# Patient Record
Sex: FEMALE | Race: WHITE | Employment: FULL TIME | URBAN - NONMETROPOLITAN AREA
[De-identification: names, ages, dates, MRNs, and addresses within clinical notes are randomized per-mention and may not be internally consistent; named-entity substitution may affect disease eponyms.]

---

## 2022-03-29 ENCOUNTER — OFFICE VISIT (OUTPATIENT)
Dept: FAMILY MEDICINE CLINIC | Age: 37
End: 2022-03-29
Payer: COMMERCIAL

## 2022-03-29 VITALS
SYSTOLIC BLOOD PRESSURE: 116 MMHG | HEART RATE: 74 BPM | DIASTOLIC BLOOD PRESSURE: 70 MMHG | HEIGHT: 69 IN | WEIGHT: 157 LBS | BODY MASS INDEX: 23.25 KG/M2 | RESPIRATION RATE: 16 BRPM

## 2022-03-29 DIAGNOSIS — R22.2 MASS OF RIGHT CHEST WALL: Primary | ICD-10-CM

## 2022-03-29 DIAGNOSIS — Z83.42 FAMILY HISTORY OF HYPERCHOLESTEROLEMIA: ICD-10-CM

## 2022-03-29 DIAGNOSIS — Z80.3 FAMILY HISTORY OF BREAST CANCER: ICD-10-CM

## 2022-03-29 PROBLEM — N60.19 FIBROCYSTIC BREAST DISEASE (FCBD) IN FEMALE: Status: ACTIVE | Noted: 2020-03-17

## 2022-03-29 PROBLEM — F32.A ANXIETY AND DEPRESSION: Status: ACTIVE | Noted: 2017-11-10

## 2022-03-29 PROBLEM — F41.9 ANXIETY AND DEPRESSION: Status: ACTIVE | Noted: 2017-11-10

## 2022-03-29 PROBLEM — R22.31 AXILLARY MASS, RIGHT: Status: ACTIVE | Noted: 2020-10-21

## 2022-03-29 PROCEDURE — 99204 OFFICE O/P NEW MOD 45 MIN: CPT | Performed by: NURSE PRACTITIONER

## 2022-03-29 RX ORDER — BUPROPION HYDROCHLORIDE 150 MG/1
150 TABLET ORAL EVERY MORNING
COMMUNITY
End: 2022-10-19 | Stop reason: SDUPTHER

## 2022-03-29 SDOH — ECONOMIC STABILITY: FOOD INSECURITY: WITHIN THE PAST 12 MONTHS, YOU WORRIED THAT YOUR FOOD WOULD RUN OUT BEFORE YOU GOT MONEY TO BUY MORE.: NEVER TRUE

## 2022-03-29 SDOH — ECONOMIC STABILITY: FOOD INSECURITY: WITHIN THE PAST 12 MONTHS, THE FOOD YOU BOUGHT JUST DIDN'T LAST AND YOU DIDN'T HAVE MONEY TO GET MORE.: NEVER TRUE

## 2022-03-29 ASSESSMENT — ENCOUNTER SYMPTOMS
EYE REDNESS: 0
DIARRHEA: 0
SINUS PRESSURE: 0
ABDOMINAL PAIN: 0
COUGH: 0
WHEEZING: 0
BACK PAIN: 0
BLOOD IN STOOL: 0
SHORTNESS OF BREATH: 0
COLOR CHANGE: 0
EYE DISCHARGE: 0
CONSTIPATION: 0
EYE ITCHING: 0
EYE PAIN: 0

## 2022-03-29 ASSESSMENT — PATIENT HEALTH QUESTIONNAIRE - PHQ9
SUM OF ALL RESPONSES TO PHQ QUESTIONS 1-9: 0
SUM OF ALL RESPONSES TO PHQ QUESTIONS 1-9: 0
1. LITTLE INTEREST OR PLEASURE IN DOING THINGS: 0
SUM OF ALL RESPONSES TO PHQ QUESTIONS 1-9: 0
SUM OF ALL RESPONSES TO PHQ QUESTIONS 1-9: 0
SUM OF ALL RESPONSES TO PHQ9 QUESTIONS 1 & 2: 0
2. FEELING DOWN, DEPRESSED OR HOPELESS: 0

## 2022-03-29 ASSESSMENT — SOCIAL DETERMINANTS OF HEALTH (SDOH): HOW HARD IS IT FOR YOU TO PAY FOR THE VERY BASICS LIKE FOOD, HOUSING, MEDICAL CARE, AND HEATING?: NOT HARD AT ALL

## 2022-03-29 NOTE — PATIENT INSTRUCTIONS
Patient Education        Well Visit, Ages 25 to 48: Care Instructions  Overview     Well visits can help you stay healthy. Your doctor has checked your overall health and may have suggested ways to take good care of yourself. Your doctor also may have recommended tests. At home, you can help prevent illness withhealthy eating, regular exercise, and other steps. Follow-up care is a key part of your treatment and safety. Be sure to make and go to all appointments, and call your doctor if you are having problems. It's also a good idea to know your test results and keep alist of the medicines you take. How can you care for yourself at home?  Get screening tests that you and your doctor decide on. Screening helps find diseases before any symptoms appear.  Eat healthy foods. Choose fruits, vegetables, whole grains, protein, and low-fat dairy foods. Limit fat, especially saturated fat. Reduce salt in your diet.  Limit alcohol. If you are a man, have no more than 2 drinks a day or 14 drinks a week. If you are a woman, have no more than 1 drink a day or 7 drinks a week.  Get at least 30 minutes of physical activity on most days of the week. Walking is a good choice. You also may want to do other activities, such as running, swimming, cycling, or playing tennis or team sports. Discuss any changes in your exercise program with your doctor.  Reach and stay at a healthy weight. This will lower your risk for many problems, such as obesity, diabetes, heart disease, and high blood pressure.  Do not smoke or allow others to smoke around you. If you need help quitting, talk to your doctor about stop-smoking programs and medicines. These can increase your chances of quitting for good.  Care for your mental health. It is easy to get weighed down by worry and stress. Learn strategies to manage stress, like deep breathing and mindfulness, and stay connected with your family and community.  If you find you often feel sad or hopeless, talk with your doctor. Treatment can help.  Talk to your doctor about whether you have any risk factors for sexually transmitted infections (STIs). You can help prevent STIs if you wait to have sex with a new partner (or partners) until you've each been tested for STIs. It also helps if you use condoms (male or female condoms) and if you limit your sex partners to one person who only has sex with you. Vaccines are available for some STIs, such as HPV.  Use birth control if it's important to you to prevent pregnancy. Talk with your doctor about the choices available and what might be best for you.  If you think you may have a problem with alcohol or drug use, talk to your doctor. This includes prescription medicines (such as amphetamines and opioids) and illegal drugs (such as cocaine and methamphetamine). Your doctor can help you figure out what type of treatment is best for you.  Protect your skin from too much sun. When you're outdoors from 10 a.m. to 4 p.m., stay in the shade or cover up with clothing and a hat with a wide brim. Wear sunglasses that block UV rays. Even when it's cloudy, put broad-spectrum sunscreen (SPF 30 or higher) on any exposed skin.  See a dentist one or two times a year for checkups and to have your teeth cleaned.  Wear a seat belt in the car. When should you call for help? Watch closely for changes in your health, and be sure to contact your doctor if you have any problems or symptoms that concern you. Where can you learn more? Go to https://IMshoppingkely.healthConscious Box. org and sign in to your [employer] account. Enter P072 in the [facility] box to learn more about \"Well Visit, Ages 25 to 48: Care Instructions. \"     If you do not have an account, please click on the \"Sign Up Now\" link. Current as of: October 6, 2021               Content Version: 13.2  © 6883-5492 [employer]. Care instructions adapted under license by [facility] ([facility]). If you have questions about a medical condition or this instruction, always ask your healthcare professional. [place] [address] any warranty or liability for your use of this information.

## 2022-03-29 NOTE — PROGRESS NOTES
[facility] PROFESSIONAL [facility] MEDICINE  [address] [facility]  Dept: [phone]  Dept Fax: [phone] 272012: [phone]     Visit Date:  3/29/2022    Patient:  Lucia Moe  YOB: 1985  Age: 31 y.o. Gender: female  BMI: Body mass index is 23.02 kg/m². Lucia Moe, New patient, is being seen today for   Chief Complaint   Patient presents with   [facility] Doctor     right side breast lump, x1 mo    . Assessment/Plan      1. Mass of right chest wall  - New  - Diagnostic mammogram and US ordered for further evaluation  - Today's assessment is reassuring  - US BREAST LIMITED RIGHT; Future  - DANILO SURI DIGITAL DIAGNOSTIC BILATERAL; Future    2. Family history of hypercholesterolemia  - Lipid Panel; Future    3. Family history of breast cancer  - DANILO SURI DIGITAL DIAGNOSTIC BILATERAL; Future      Return in about 1 year (around 3/29/2023) for Annual Well Visit. HPI:     New patient. Health maintenance reviewed. Former patient of Dr. Jarett Castillo in [place]. Current specialist includes Dr. Albert Masters, [facility]. . Medical history signifant for anxiety and depression. Taking Wellbutrin. Symptoms well-controlled. Previously treated with lexapro. Current concern related to palpable mass to right upper chest. First noticed 1 month ago. No tenderness or pain. No breast changes. No skin color changes. Soft, moveable. Family history of breast cancer. Was evaluated 1.5 years ago for seperate right axillary mass. This was evaluated twice with repeat US. The mass was determined to be a prominent lymph node. Was recommended for annual breast cancer screenings with mammogram.    PHQ-9 Total Score: 0 (3/29/2022  1:05 PM)      Medications    Current Outpatient Medications:     buPROPion (WELLBUTRIN XL) 150 MG extended release tablet, Take 150 mg by mouth every morning, Disp: , Rfl:     The patient has No Known Allergies.     Past Medical History  Jenae Justin  has a past medical history of Anxiety and Depression. Past Surgical History  The patient  has a past surgical history that includes  section and [place] tooth extraction. Family History  This patient's family history includes Breast Cancer in her maternal grandmother; Heart Disease in her maternal grandfather; High Cholesterol in her maternal grandfather; Prudence Brace in her paternal grandfather. Social History  Jenae Justin  reports that she has never smoked. She has never used smokeless tobacco. She reports previous alcohol use. She reports that she does not use drugs. Health Maintenance:    Health maintenance reviewed. Health Maintenance   Topic Date Due    Hepatitis C screen  Never done    Varicella vaccine (1 of 2 - 2-dose childhood series) Never done    Depression Monitoring  Never done    HIV screen  Never done    Flu vaccine (1) 2021    COVID-19 Vaccine (2 - Booster for Janay series) 10/26/2021    Cervical cancer screen  10/27/2024    DTaP/Tdap/Td vaccine (3 - Td or Tdap) 2030    Hepatitis A vaccine  Aged Out    Hepatitis B vaccine  Aged Out    Hib vaccine  Aged Out    Meningococcal (ACWY) vaccine  Aged Out    Pneumococcal 0-64 years Vaccine  Aged Out       Subjective/Objective:      Review of Systems   Constitutional: Negative for chills, fatigue and fever. HENT: Negative for congestion, ear pain, sinus pressure and sneezing. Eyes: Negative for pain, discharge, redness and itching. Respiratory: Negative for cough, shortness of breath and wheezing. Cardiovascular: Negative for chest pain, palpitations and leg swelling. Gastrointestinal: Negative for abdominal pain, blood in stool, constipation and diarrhea. Endocrine: Negative for polydipsia, polyphagia and polyuria. Genitourinary: Negative for difficulty urinating and hematuria. Musculoskeletal: Negative for arthralgias, back pain and neck pain.    Skin: Negative for color change, pallor and rash. Palpable mass to right upper chest   Allergic/Immunologic: Negative for environmental allergies and food allergies. Neurological: Negative for dizziness, light-headedness, numbness and headaches. Psychiatric/Behavioral: Negative for agitation and confusion. The patient is not nervous/anxious. /70   Pulse 74   Resp 16   Ht 5' 9.25\" (1.759 m)   Wt 157 lb (71.2 kg)   LMP 03/27/2022 (Exact Date)   BMI 23.02 kg/m²     Physical Exam  Vitals and nursing note reviewed. Constitutional:       General: She is awake. Appearance: Normal appearance. HENT:      Head: Normocephalic and atraumatic. Right Ear: Hearing and external ear normal.      Left Ear: Hearing and external ear normal.      Nose: Nose normal. No congestion or rhinorrhea. Eyes:      General: Lids are normal.         Right eye: No discharge. Left eye: No discharge. Conjunctiva/sclera: Conjunctivae normal.   Neck:      Trachea: No tracheal deviation. Cardiovascular:      Rate and Rhythm: Normal rate and regular rhythm. Heart sounds: Normal heart sounds. No murmur heard. Pulmonary:      Effort: Pulmonary effort is normal. No respiratory distress. Breath sounds: No stridor. No wheezing. Chest:      Chest wall: Mass present. Musculoskeletal:      Cervical back: Full passive range of motion without pain. Skin:     General: Skin is dry. Coloration: Skin is not jaundiced or pale. Neurological:      General: No focal deficit present. Mental Status: She is alert. Mental status is at baseline. Psychiatric:         Mood and Affect: Mood and affect normal.         Behavior: Behavior is cooperative. Labs Reviewed 3/29/2022:    No results found for: WBC, HGB, HCT, PLT, CHOL, TRIG, HDL, LDLDIRECT, ALT, AST, NA, K, CL, CREATININE, BUN, CO2, TSH, PSA, INR, GLUF, LABA1C, LABMICR        Patient given educational materials - see patient instructions. Discussed use, benefit, and side effects of prescribed medications. All patient questions answered. Pt voiced understanding. Reviewed health maintenance.        Electronically signed by ELYSSA Whitmore CNP on 3/29/2022 at 1:14 PM EDT

## 2022-03-31 ENCOUNTER — HOSPITAL ENCOUNTER (OUTPATIENT)
Dept: WOMENS IMAGING | Age: 37
Discharge: HOME | End: 2022-03-31
Payer: COMMERCIAL

## 2022-03-31 ENCOUNTER — HOSPITAL ENCOUNTER (OUTPATIENT)
Dept: WOMENS IMAGING | Age: 37
Discharge: HOME | End: 2022-03-31

## 2022-03-31 ENCOUNTER — TELEPHONE (OUTPATIENT)
Dept: FAMILY MEDICINE CLINIC | Age: 37
End: 2022-03-31

## 2022-03-31 DIAGNOSIS — Z00.6 ENCOUNTER FOR EXAMINATION FOR NORMAL COMPARISON OR CONTROL IN CLINICAL RESEARCH PROGRAM: ICD-10-CM

## 2022-03-31 DIAGNOSIS — R22.2 MASS OF RIGHT CHEST WALL: ICD-10-CM

## 2022-03-31 DIAGNOSIS — Z80.3 FAMILY HISTORY OF BREAST CANCER: ICD-10-CM

## 2022-03-31 PROCEDURE — 76642 ULTRASOUND BREAST LIMITED: CPT

## 2022-03-31 PROCEDURE — 77066 DX MAMMO INCL CAD BI: CPT

## 2022-03-31 NOTE — TELEPHONE ENCOUNTER
----- Message from ELYSSA Soto CNP sent at 3/31/2022 12:18 PM EDT -----  No concerning findings noted on US and mammogram. US revealed a potential skin lesion. No treatment necessary at this time. Continue to monitor. Expect a result note in the mail.

## 2022-06-28 ENCOUNTER — OFFICE VISIT (OUTPATIENT)
Dept: FAMILY MEDICINE CLINIC | Age: 37
End: 2022-06-28
Payer: COMMERCIAL

## 2022-06-28 VITALS
BODY MASS INDEX: 22.87 KG/M2 | SYSTOLIC BLOOD PRESSURE: 128 MMHG | WEIGHT: 156 LBS | RESPIRATION RATE: 12 BRPM | DIASTOLIC BLOOD PRESSURE: 76 MMHG | HEART RATE: 78 BPM

## 2022-06-28 DIAGNOSIS — R22.42 MASS OF LEFT FOOT: Primary | ICD-10-CM

## 2022-06-28 PROCEDURE — 99213 OFFICE O/P EST LOW 20 MIN: CPT | Performed by: NURSE PRACTITIONER

## 2022-06-28 ASSESSMENT — ENCOUNTER SYMPTOMS: COLOR CHANGE: 0

## 2022-06-28 NOTE — PATIENT INSTRUCTIONS
Patient Education        Ganglions: Care Instructions  Your Care Instructions     A ganglion is a small sac, or cyst, filled with a clear fluid that is like jelly. A ganglion may look like a bump on the hand or wrist. It also can appear on your feet, ankles, knees, or shoulders. It is not cancer. A ganglion can grow out of the protective area, or capsule, around a joint. It also can grow on a tendon sheath, which covers the ropelike tendons that connect muscle tobone. A ganglion may hurt or cause numbness if it presses on a nerve. Many ganglions do not need treatment, and they often go away on their own. But if a ganglion hurts, becomes larger, causes numbness, or limits your activity, your doctor may want to drain it with a needle and syringe or remove it withminor surgery. Follow-up care is a key part of your treatment and safety. Be sure to make and go to all appointments, and call your doctor if you are having problems. It's also a good idea to know your test results and keep alist of the medicines you take. How can you care for yourself at home?  Wear a wrist or finger splint as directed by your doctor. It will keep your wrist or hand from moving and help reduce the fluid in the cyst. This may be all you need for the ganglion to shrink and go away.  Do not smash a ganglion with a book or other heavy object. You may break a bone or otherwise injure your wrist by trying this folk remedy, and the ganglion may return anyway.  Do not try to drain the fluid by poking the ganglion with a pin or any other sharp object. You could cause an infection. When should you call for help? Call your doctor now or seek immediate medical care if:     You have signs of infection, such as:  ? Increased pain, swelling, warmth, or redness. ? Red streaks leading from the cyst.  ? Pus draining from the cyst.  ? A fever.    Watch closely for changes in your health, and be sure to contact your doctor if:     You have increasing pain.      Your ganglion is getting larger.      You still have pain or numbness from a ganglion. Where can you learn more? Go to https://chpepiceweb.[email or web address] org and sign in to your Wrapp account. Enter C757 in the [facility] box to learn more about \"Ganglions: Care Instructions. \"     If you do not have an account, please click on the \"Sign Up Now\" link. Current as of: March 9, 2022               Content Version: 13.3  © 7501-7032 [employer]. Care instructions adapted under license by [facility] ([facility]). If you have questions about a medical condition or this instruction, always ask your healthcare professional. [place] [address] any warranty or liability for your use of this information. Patient Education        Ganglion Cyst Removal: What to Expect at Home  Your Recovery     Ganglion cyst removal is surgery to remove a ganglion that has caused pain or numbness or made it hard to do your activities. A ganglion is a small sac, or cyst, filled with a clear fluid that is thick like jelly. The cyst may look like a bump on your hand or wrist. Less often, a ganglion can appear on thefeet, ankles, knees, or shoulders. The doctor made a cut (incision) in the skin over the ganglion. He or she removed the ganglion and the connecting tissue that allowed fluid to collect there. Then the incision was closed with stitches. You may have a splint overthe area to limit movement until the area heals. Your doctor will tell you when it's okay to move the area. He or she may giveyou instructions on when you can do your normal activities again. Ganglions sometimes come back. New ganglions also may form in the area. This care sheet gives you a general idea about how long it will take for you to recover. But each person recovers at a different pace. Follow the steps belowto get better as quickly as possible.   How can you care for yourself at home?  Activity     For 1 to 2 weeks after surgery on your hand or wrist, avoid activities that involve repeated arm or hand movements. These may include typing, using a computer mouse, vacuuming, or carrying things in the affected hand. Do not use power tools. And avoid other activities that make your hand vibrate.      If the procedure involved your foot or ankle, ask your doctor if you need to do less walking or driving for a while.      You may be able to go back to work 1 or 2 days after surgery. It depends on the type of work you do and how you feel.      You may shower, but do not get the area wet until your doctor says it's okay. Keep the bandage dry by covering it with plastic. Do not take a bath, swim, use a hot tub, or soak your hand until your doctor says it's okay. Diet     You can eat your normal diet when you feel well. If your stomach is upset, try bland, low-fat foods like plain rice, broiled chicken, toast, and yogurt. Medicines     Your doctor will tell you if and when you can restart your medicines. He or she will also give you instructions about taking any new medicines.      If you take aspirin or some other blood thinner, be sure to talk to your doctor. He or she will tell you if and when to start taking this medicine again. Make sure that you understand exactly what your doctor wants you to do.      Be safe with medicines. Read and follow all instructions on the label. ? If the doctor gave you a prescription medicine for pain, take it as prescribed. ? If you are not taking a prescription pain medicine, ask your doctor if you can take an over-the-counter medicine. Incision care     Leave the bandage on your hand until the doctor says it is okay to remove it. This is usually 2 or 3 days after surgery.      After your doctor says you can take off your bandage, wash the area daily with clean water, and pat it dry. Don't use hydrogen peroxide or alcohol. They can slow healing. You may cover the area with a gauze bandage if it oozes fluid or rubs against clothing. Change the bandage every day.      Keep the area clean and dry.      If you have a splint, do not take it off unless your doctor tells you to. Follow the splint care instructions your doctor gives you. Be careful not to put the splint on too tight. Exercise     Follow your doctor's directions on when and how to move the area to keep it flexible and help reduce swelling. Your doctor may have you wear a splint or brace for a short time after the surgery.      If the ganglion is on your wrist or hand, you may need therapy after you heal. This can help you regain movement, strength, and  in your wrist and hand. To get the best results, you need to do the exercises correctly and as often and as long as your doctor or your physical or occupational therapist tells you to. Ice and elevation     If you have swelling, put ice or a cold pack on the area for 10 to 20 minutes at a time. Try to do this every 1 to 2 hours for the next 3 days (when you are awake) or until the swelling goes down. Put a thin cloth between the ice and your skin or splint.      Prop up the area on a pillow anytime you sit or lie down for the first 2 or 3 days. Try to keep it above the level of your heart. This will help reduce swelling. Follow-up care is a key part of your treatment and safety. Be sure to make and go to all appointments, and call your doctor if you are having problems. It's also a good idea to know your test results and keep alist of the medicines you take. When should you call for help? Call 911  anytime you think you may need emergency care. For example, call if:     You passed out (lost consciousness).      You have chest pain, are short of breath, or cough up blood.    Call your doctor now or seek immediate medical care if:     You have pain that does not get better after you take pain medicine.      You have loose stitches, or your incision comes open.      Bright red blood has soaked through the bandage over your incision.      You have symptoms of infection, such as:  ? Increased pain, swelling, warmth, or redness. ? Red streaks leading from the area. ? Pus draining from the area. ? A fever.      The area is cool or pale or changes color.      The area is tingly, weak, or numb.      You can't move the area.      Your splint feels too tight. Watch closely for any changes in your health, and be sure to contact yourdoctor if:     You do not get better as expected. Where can you learn more? Go to https://chpepiceweb.[email or web address] B [email or web address] org and sign in to your Sungy Mobile account. Enter G125 in the [employer] box to learn more about \"Ganglion Cyst Removal: What to Expect at Home. \"     If you do not have an account, please click on the \"Sign Up Now\" link. Current as of: March 9, 2022               Content Version: 13.3  © 2006-2022 [employer]. Care instructions adapted under license by [facility] ([facility]). If you have questions about a medical condition or this instruction, always ask your healthcare professional. [place] [address] any warranty or liability for your use of this information. Patient Education        Lipoma: Care Instructions  Your Care Instructions  A lipoma is a growth of fat just below the skin. It may feel soft and rubbery. Lipomas can occur anywhere on the body. But they are most common on the torso,neck, upper thighs, upper arms, and armpits. A lipoma does not turn into cancer. Lipomas usually are not treated, because most of them don't hurt or cause problems. But your doctor may remove a lipoma if it is painful, gets infected,or bothers you. Follow-up care is a key part of your treatment and safety. Be sure to make and go to all appointments, and call your doctor if you are having problems.  It's also a good idea to know your test results and keep tumors don't usually spread to other tissues and organs. They usually aren't life-threatening. But they can cause problems if they grow toomuch or damage healthy bone. Most bone tumors are benign. Many benign bone tumors may not need to be treated. But if the tumor causes pain, weakens the bone, or keeps you frommoving a part of your body, it may need to be removed. What are some common types of benign bone tumors? Benign bone tumors grow inside the bones. As they grow, these tumors may involve tissues near the bones, such as tendons and ligaments that attach tothe bone. Osteochondroma. These tumors appear most often in the bones of the legs. They may feel like abony spur on the knee or upper shoulder. Giant cell tumor of bone. These are fast-growing tumors that need to be treated as soon as possible. Theyare more common in young adults. They often appear near the knee. Osteoblastoma. These are often found in the spine or pelvis. Aneurysmal bone cyst.   These may be found in the arms, legs, or spine. They are blood-filled growthsthat can swell inside the bone. Fibrous dysplasia. This can affect one or more bones. It may be found in the long bones of thearms and legs. Enchondroma. This is another type of bone tumor that can start in the cartilage. It often appears on the hands and feet. It can also appear on the long bones of the armsand legs. What are the symptoms? Sometimes the bone tumor can be felt as a bump on one of the bones. Or it mightbe inside the bone, and you won't feel it. You may also feel pain near the tumor. Tumors can weaken bone, which can then break, or fracture. A tumor that grows near a joint, like your shoulder or knee, may keep you from being able to moveyour arm or leg freely. How are they diagnosed? Your doctor will ask you about your symptoms and past health and will examine you.  If your doctor can feel a bone tumor, or if you have other symptoms, you will get some tests. The tests can help make sure the tumor isn't cancer. Rosalee Roe also help the doctor find the best treatment for the tumor. Your doctor may also find a tumor when taking X-rays or images for anotherproblem.  You may have one or more imaging tests to get a better look at the tumor. These may include:  ? X-rays. ? A CT scan. ? An MRI scan. ? A bone scan.  You may need a blood test and other lab tests.  You may need a biopsy so a sample of the tumor can be looked at under a microscope. Kory Moralez may also look at other parts of your body for other tumors. How are they treated? Some benign bone tumors that aren't causing problems can be managed with regular checkups and imaging tests. But if the tumor is causing problems, thentreatment will likely be needed. A doctor may remove a tumor with surgery. If part of the bone is removed, itmay be replaced with new bone or artificial bone. Ask the doctor or specialist about other types of treatments available for thetumor. After treatment, the doctor may want to check the area again to make sure thegrowth doesn't come back. Follow-up care is a key part of your treatment and safety. Be sure to make and go to all appointments, and call your doctor if you are having problems. It's also a good idea to know your test results and keep alist of the medicines you take. Where can you learn more? Go to https://CanoPkjGlobalCrypto.Trustribe. org and sign in to your Groupsite account. Enter [phone] in the [facility] box to learn more about \"Learning About Benign Bone Tumors. \"     If you do not have an account, please click on the \"Sign Up Now\" link. Current as of: September 8, 2021               Content Version: 13.3  © 2006-2022 [employer]. Care instructions adapted under license by [facility] ([facility]).  If you have questions about a medical condition or this instruction, always ask your healthcare professional. Brayan Boothe any warranty or liability for your use of this information.

## 2022-06-28 NOTE — PROGRESS NOTES
Sunday Church (:  1985) is a 30 y.o. female,Established patient, here for evaluation of the following chief complaint(s): Mass (left foot, not painful, unsure how long its been there )         ASSESSMENT/PLAN:  1. Mass of left foot  - New  - Symptoms most consistent with pedal ganglion cyst  - No treatment necessary at this time given asymptomatic status  - Consider podiatry referral if area becomes tender or continues to enlarge    Return if symptoms worsen or fail to improve. Subjective   SUBJECTIVE/OBJECTIVE:  Left foot mass. First noticed 1 week ago. Nonpainful. No skin changes. No previous injury. Movable. Has not grown in size since first noticing. Has never experienced similar symptoms. Review of Systems   Constitutional: Negative for fever. Musculoskeletal: Negative for gait problem, joint swelling and myalgias. Skin: Negative for color change and wound. Lesion of left foot          Objective   Physical Exam  Constitutional:       General: She is not in acute distress. Appearance: Normal appearance. HENT:      Head: Normocephalic and atraumatic. Right Ear: External ear normal.      Left Ear: External ear normal.      Nose: No nasal deformity or rhinorrhea. Mouth/Throat:      Lips: Pink. No lesions. Eyes:      General: Lids are normal.         Right eye: No discharge. Left eye: No discharge. Conjunctiva/sclera: Conjunctivae normal.   Pulmonary:      Effort: No accessory muscle usage or respiratory distress. Breath sounds: No stridor. Musculoskeletal:      Cervical back: Normal range of motion. Feet:    Skin:     Coloration: Skin is not pale. Findings: No rash. Neurological:      General: No focal deficit present. Mental Status: She is alert. Mental status is at baseline. Psychiatric:         Mood and Affect: Mood normal.         Behavior: Behavior is cooperative.                   An electronic signature was used to authenticate this note.     --ELYSSA Cedillo - CNP

## 2022-08-16 ENCOUNTER — HOSPITAL ENCOUNTER (OUTPATIENT)
Age: 37
Discharge: HOME | End: 2022-08-16
Payer: COMMERCIAL

## 2022-08-16 ENCOUNTER — OFFICE VISIT (OUTPATIENT)
Dept: FAMILY MEDICINE CLINIC | Age: 37
End: 2022-08-16
Payer: COMMERCIAL

## 2022-08-16 VITALS
DIASTOLIC BLOOD PRESSURE: 72 MMHG | SYSTOLIC BLOOD PRESSURE: 110 MMHG | HEART RATE: 85 BPM | BODY MASS INDEX: 23.02 KG/M2 | WEIGHT: 157 LBS | OXYGEN SATURATION: 99 %

## 2022-08-16 DIAGNOSIS — M94.0 ACUTE COSTOCHONDRITIS: Primary | ICD-10-CM

## 2022-08-16 DIAGNOSIS — Z83.42 FAMILY HISTORY OF HYPERCHOLESTEROLEMIA: ICD-10-CM

## 2022-08-16 LAB
CHOLESTEROL, TOTAL: 187 MG/DL (ref 100–199)
HDLC SERPL-MCNC: 53 MG/DL
LDL CHOLESTEROL CALCULATED: 117 MG/DL
TRIGL SERPL-MCNC: 85 MG/DL (ref 0–199)

## 2022-08-16 PROCEDURE — 80061 LIPID PANEL: CPT

## 2022-08-16 PROCEDURE — 36415 COLL VENOUS BLD VENIPUNCTURE: CPT

## 2022-08-16 PROCEDURE — 99213 OFFICE O/P EST LOW 20 MIN: CPT | Performed by: NURSE PRACTITIONER

## 2022-08-16 RX ORDER — PREDNISONE 20 MG/1
TABLET ORAL
Qty: 15 TABLET | Refills: 0 | Status: SHIPPED | OUTPATIENT
Start: 2022-08-16 | End: 2022-10-18 | Stop reason: ALTCHOICE

## 2022-08-16 ASSESSMENT — ENCOUNTER SYMPTOMS: SHORTNESS OF BREATH: 0

## 2022-08-16 NOTE — PROGRESS NOTES
Karena Tanner (:  1985) is a 30 y.o. female,Established patient, here for evaluation of the following chief complaint(s):  Chest Pain (No SOB or or weakness, no pressure, x2 months )         ASSESSMENT/PLAN:  1. Acute costochondritis  - Acute  - Start prednisone for symptoms consistent with costochondritis  - Based on current presentation, symptoms to not appear to be cardiac in nature  - Notify office if no improvement in symptoms  -     predniSONE (DELTASONE) 20 MG tablet; Take 1 tablet twice daily for five days, then take 1 tablet once a day for 5 days. , Disp-15 tablet, R-0Normal    Return if symptoms worsen or fail to improve. Subjective   SUBJECTIVE/OBJECTIVE:  Chest Pain   This is a new problem. The current episode started more than 1 month ago (2 months). The onset quality is sudden. The problem occurs daily. The problem has been unchanged. The pain is present in the lateral region. The quality of the pain is described as pressure (aching). The pain does not radiate. Pertinent negatives include no diaphoresis, exertional chest pressure, irregular heartbeat, numbness or shortness of breath. The pain is aggravated by nothing. She has tried nothing for the symptoms. Pertinent negatives for past medical history include no CAD. Her family medical history is significant for heart disease. Review of Systems   Constitutional:  Negative for diaphoresis. Respiratory:  Negative for shortness of breath. Cardiovascular:  Positive for chest pain. Neurological:  Negative for syncope and numbness. Objective   Physical Exam  Vitals and nursing note reviewed. Constitutional:       General: She is awake. Appearance: Normal appearance. HENT:      Head: Normocephalic and atraumatic. Right Ear: Hearing and external ear normal.      Left Ear: Hearing and external ear normal.      Nose: Nose normal. No congestion or rhinorrhea.    Eyes:      General: Lids are normal. Right eye: No discharge. Left eye: No discharge. Conjunctiva/sclera: Conjunctivae normal.   Neck:      Trachea: No tracheal deviation. Cardiovascular:      Rate and Rhythm: Normal rate and regular rhythm. Heart sounds: Normal heart sounds. No murmur heard. Pulmonary:      Effort: Pulmonary effort is normal. No respiratory distress. Breath sounds: No stridor. No wheezing. Chest:      Chest wall: Tenderness present. No mass or swelling. Musculoskeletal:      Cervical back: Full passive range of motion without pain. Skin:     General: Skin is dry. Coloration: Skin is not jaundiced or pale. Neurological:      General: No focal deficit present. Mental Status: She is alert. Mental status is at baseline. Psychiatric:         Mood and Affect: Mood and affect normal.         Behavior: Behavior is cooperative. An electronic signature was used to authenticate this note.     --ELYSSA Guthrie - CNP

## 2022-08-17 ENCOUNTER — TELEPHONE (OUTPATIENT)
Dept: FAMILY MEDICINE CLINIC | Age: 37
End: 2022-08-17

## 2022-08-17 NOTE — TELEPHONE ENCOUNTER
----- Message from ELYSSA Villafana CNP sent at 8/17/2022  9:41 AM EDT -----  Cholesterol looks okay. LDL slightly elevated but total and hdl are good. No changes necessary. Continue with healthy diet and exercise.

## 2022-10-18 ENCOUNTER — HOSPITAL ENCOUNTER (EMERGENCY)
Age: 37
Discharge: HOME | End: 2022-10-18
Payer: COMMERCIAL

## 2022-10-18 VITALS
DIASTOLIC BLOOD PRESSURE: 67 MMHG | OXYGEN SATURATION: 98 % | TEMPERATURE: 97.5 F | RESPIRATION RATE: 16 BRPM | HEART RATE: 85 BPM | BODY MASS INDEX: 24.61 KG/M2 | WEIGHT: 156.8 LBS | HEIGHT: 67 IN | SYSTOLIC BLOOD PRESSURE: 126 MMHG

## 2022-10-18 DIAGNOSIS — J20.8 ACUTE VIRAL BRONCHITIS: Primary | ICD-10-CM

## 2022-10-18 PROCEDURE — 99213 OFFICE O/P EST LOW 20 MIN: CPT | Performed by: NURSE PRACTITIONER

## 2022-10-18 PROCEDURE — 99213 OFFICE O/P EST LOW 20 MIN: CPT

## 2022-10-18 RX ORDER — PREDNISONE 20 MG/1
20 TABLET ORAL DAILY
Qty: 5 TABLET | Refills: 0 | Status: SHIPPED | OUTPATIENT
Start: 2022-10-18 | End: 2022-10-23

## 2022-10-18 RX ORDER — ALBUTEROL SULFATE 90 UG/1
2 AEROSOL, METERED RESPIRATORY (INHALATION) EVERY 6 HOURS PRN
Qty: 18 G | Refills: 0 | Status: SHIPPED | OUTPATIENT
Start: 2022-10-18

## 2022-10-18 ASSESSMENT — ENCOUNTER SYMPTOMS
RHINORRHEA: 0
SORE THROAT: 0
SHORTNESS OF BREATH: 0
DIARRHEA: 0
VOMITING: 0
EYE DISCHARGE: 0
NAUSEA: 0
TROUBLE SWALLOWING: 0
EYE REDNESS: 0
CHEST TIGHTNESS: 1
COUGH: 1

## 2022-10-18 ASSESSMENT — PAIN - FUNCTIONAL ASSESSMENT: PAIN_FUNCTIONAL_ASSESSMENT: 0-10

## 2022-10-18 ASSESSMENT — PAIN SCALES - GENERAL: PAINLEVEL_OUTOF10: 0

## 2022-10-18 NOTE — ED TRIAGE NOTES
Pt c/o persistent cough x 7 days. Pt states cough is mainly dry but occasional productive green sputum noted. Denies fever. States she did take home covid test last Saturday which was negative. Pt states she has \"coughing spells\" that cause her to feel as if she cannot catch her breath at times.

## 2022-10-18 NOTE — ED PROVIDER NOTES
[address]  Urgent Care Encounter      CHIEF COMPLAINT       Chief Complaint   Patient presents with    Cough     X 1 week no improvement       Nurses Notes reviewed and I agree except as noted in the HPI. HISTORY OF PRESENT ILLNESS   Александр Trevizo is a 30 y.o. female who presents for evaluation of cough. Onset 1 week ago, unchanged. Cough is intermittent, productive. Associated chest tightness. No chest pain, shortness of breath. No fever. No travel. No exposure to COVID, strep, flu. Patient began taking Mucinex yesterday without relief. No additional complaints. REVIEW OF SYSTEMS     Review of Systems   Constitutional:  Negative for chills, diaphoresis, fatigue and fever. HENT:  Negative for congestion, ear pain, rhinorrhea, sore throat and trouble swallowing. Eyes:  Negative for discharge and redness. Respiratory:  Positive for cough and chest tightness. Negative for shortness of breath. Cardiovascular:  Negative for chest pain. Gastrointestinal:  Negative for diarrhea, nausea and vomiting. Genitourinary:  Negative for decreased urine volume. Musculoskeletal:  Negative for neck pain and neck stiffness. Skin:  Negative for rash. Neurological:  Negative for headaches. Hematological:  Negative for adenopathy. Psychiatric/Behavioral:  Negative for sleep disturbance. PAST MEDICAL HISTORY         Diagnosis Date    Anxiety     Depression        SURGICAL HISTORY     Patient  has a past surgical history that includes  section and [place] tooth extraction. CURRENT MEDICATIONS       Discharge Medication List as of 10/18/2022  6:17 PM        CONTINUE these medications which have NOT CHANGED    Details   buPROPion (WELLBUTRIN XL) 150 MG extended release tablet Take 150 mg by mouth every morningHistorical Med             ALLERGIES     Patient is has No Known Allergies.     FAMILY HISTORY     Patient'sfamily history includes Breast Cancer (age of onset: 48) in her maternal grandmother; Heart Disease in her maternal grandfather; High Cholesterol in her maternal grandfather; [place] in her paternal grandfather. SOCIAL HISTORY     Patient  reports that she has never smoked. She has never used smokeless tobacco. She reports current alcohol use. She reports that she does not use drugs. PHYSICAL EXAM     ED TRIAGE VITALS  BP: 126/67, Temp: 97.5 °F (36.4 °C), Heart Rate: 85, Resp: 16, SpO2: 98 %  Physical Exam  Vitals and nursing note reviewed. Constitutional:       General: She is not in acute distress. Appearance: Normal appearance. She is well-developed. She is not ill-appearing, toxic-appearing or diaphoretic. HENT:      Head: Normocephalic and atraumatic. Right Ear: Hearing, tympanic membrane, ear canal and external ear normal. No mastoid tenderness. No hemotympanum. Tympanic membrane is not perforated, erythematous or bulging. Left Ear: Hearing, tympanic membrane, ear canal and external ear normal. No mastoid tenderness. No hemotympanum. Tympanic membrane is not perforated, erythematous or bulging. Nose: Nose normal.      Mouth/Throat:      Mouth: Mucous membranes are moist.      Pharynx: Oropharynx is clear. Uvula midline. Tonsils: No tonsillar abscesses. Eyes:      General: No scleral icterus. Conjunctiva/sclera: Conjunctivae normal.      Right eye: Right conjunctiva is not injected. No hemorrhage. Left eye: Left conjunctiva is not injected. No hemorrhage. Neck:      Thyroid: No thyromegaly. Trachea: Trachea normal.   Cardiovascular:      Rate and Rhythm: Normal rate and regular rhythm. No extrasystoles are present. Chest Wall: PMI is not displaced. Heart sounds: Normal heart sounds. No murmur heard. No friction rub. No gallop. Pulmonary:      Effort: Pulmonary effort is normal. No respiratory distress. Breath sounds: Normal breath sounds.    Musculoskeletal:      Cervical back: Normal range of motion and neck supple. Lymphadenopathy:      Head:      Right side of head: No submental, submandibular, tonsillar or occipital adenopathy. Left side of head: No submental, submandibular, tonsillar or occipital adenopathy. Cervical: No cervical adenopathy. Upper Body:      Right upper body: No supraclavicular adenopathy. Left upper body: No supraclavicular adenopathy. Skin:     General: Skin is warm and dry. Capillary Refill: Capillary refill takes less than 2 seconds. Coloration: Skin is not pale. Findings: No rash. Comments: Skin warm and dry to touch, no rashes noted on exposed surfaces. Neurological:      Mental Status: She is alert and oriented to person, place, and time. She is not disoriented. Psychiatric:         Mood and Affect: Mood normal.         Behavior: Behavior is cooperative. DIAGNOSTIC RESULTS   Labs:No results found for this visit on 10/18/22. IMAGING:  No orders to display      URGENT CARE COURSE:     Vitals:    10/18/22 1802   BP: 126/67   Pulse: 85   Resp: 16   Temp: 97.5 °F (36.4 °C)   TempSrc: Temporal   SpO2: 98%   Weight: 156 lb 12.8 oz (71.1 kg)   Height: 5' 7\" (1.702 m)       Medications - No data to display  PROCEDURES:  None  FINAL IMPRESSION      1. Acute viral bronchitis        DISPOSITION/PLAN   DISPOSITION Decision To Discharge 10/18/2022 06:16:20 PM    Nontoxic, no distress. Exam consistent with acute viral bronchitis. Medication as prescribed, continue current treatment. If any distress go to ER. PATIENT REFERRED TO:  Donalee Kehr, APRN - CNP  [address] [facility]  [phone]      Follow-up as needed. Medication as prescribed, continue Mucinex. Increase fluids. If symptoms worsen return or go to ER.     DISCHARGE MEDICATIONS:  Discharge Medication List as of 10/18/2022  6:17 PM        START taking these medications    Details   predniSONE (DELTASONE) 20 MG tablet Take 1 tablet by mouth daily for 5 days, Disp-5 tablet, R-0Normal      albuterol sulfate HFA (PROVENTIL HFA) 108 (90 Base) MCG/ACT inhaler Inhale 2 puffs into the lungs every 6 hours as needed for Wheezing or Shortness of Breath, Disp-18 g, R-0Normal           Discharge Medication List as of 10/18/2022  6:17 PM          ELYSSA Taylor CNP, APRN - CNP  10/18/22 1846

## 2022-10-19 RX ORDER — BUPROPION HYDROCHLORIDE 150 MG/1
150 TABLET ORAL EVERY MORNING
Qty: 90 TABLET | Refills: 1 | Status: SHIPPED | OUTPATIENT
Start: 2022-10-19

## 2022-10-19 NOTE — TELEPHONE ENCOUNTER
[facility] called requesting a refill on the following medications:  Requested Prescriptions     Pending Prescriptions Disp Refills    buPROPion (WELLBUTRIN XL) 150 MG extended release tablet 90 tablet 1     Sig: Take 1 tablet by mouth every morning       Date of last visit: 8/16/2022  Date of next visit (if applicable):Visit date not found  Date of last refill:   Pharmacy Name: [address] [place] [address]       Faisal,  Willian Mitchell MA

## 2022-10-26 ENCOUNTER — TELEPHONE (OUTPATIENT)
Dept: FAMILY MEDICINE CLINIC | Age: 37
End: 2022-10-26

## 2022-10-26 ENCOUNTER — OFFICE VISIT (OUTPATIENT)
Dept: FAMILY MEDICINE CLINIC | Age: 37
End: 2022-10-26
Payer: COMMERCIAL

## 2022-10-26 ENCOUNTER — HOSPITAL ENCOUNTER (OUTPATIENT)
Age: 37
Discharge: HOME | End: 2022-10-26
Payer: COMMERCIAL

## 2022-10-26 ENCOUNTER — HOSPITAL ENCOUNTER (OUTPATIENT)
Dept: GENERAL RADIOLOGY | Age: 37
Discharge: HOME | End: 2022-10-26
Payer: COMMERCIAL

## 2022-10-26 VITALS
HEIGHT: 67 IN | DIASTOLIC BLOOD PRESSURE: 76 MMHG | OXYGEN SATURATION: 98 % | WEIGHT: 156.2 LBS | HEART RATE: 96 BPM | SYSTOLIC BLOOD PRESSURE: 126 MMHG | BODY MASS INDEX: 24.52 KG/M2 | TEMPERATURE: 98.5 F | RESPIRATION RATE: 16 BRPM

## 2022-10-26 DIAGNOSIS — J40 BRONCHITIS: ICD-10-CM

## 2022-10-26 DIAGNOSIS — J40 BRONCHITIS: Primary | ICD-10-CM

## 2022-10-26 PROCEDURE — 71046 X-RAY EXAM CHEST 2 VIEWS: CPT

## 2022-10-26 PROCEDURE — 99213 OFFICE O/P EST LOW 20 MIN: CPT | Performed by: NURSE PRACTITIONER

## 2022-10-26 RX ORDER — DOXYCYCLINE HYCLATE 100 MG
100 TABLET ORAL 2 TIMES DAILY
Qty: 14 TABLET | Refills: 0 | Status: SHIPPED | OUTPATIENT
Start: 2022-10-26 | End: 2022-11-02

## 2022-10-26 ASSESSMENT — ENCOUNTER SYMPTOMS
CHEST TIGHTNESS: 1
SHORTNESS OF BREATH: 1
GASTROINTESTINAL NEGATIVE: 1
COUGH: 1
SINUS PRESSURE: 1
EYES NEGATIVE: 1
WHEEZING: 1
SINUS PAIN: 1

## 2022-10-26 NOTE — PROGRESS NOTES
Angela Álvarez (:  1985) is a 30 y.o. female,Established patient, here for evaluation of the following chief complaint(s):  Follow-up (Follow upf rom ED for URI)         ASSESSMENT/PLAN:  1. Bronchitis  -     doxycycline hyclate (VIBRA-TABS) 100 MG tablet; Take 1 tablet by mouth 2 times daily for 7 days, Disp-14 tablet, R-0Normal  -     XR CHEST STANDARD (2 VW); Future    Return if symptoms worsen or fail to improve. R/o pneumonia. Continue using albuterol inhaler. Continue Delsym    Subjective   SUBJECTIVE/OBJECTIVE:  GENARO  Duke Pearl presents for evaluation of cough and chest congestion. Symptoms ongoing for the past 2 weeks. She was treated in  on 10/18/22 for viral URI and given steroids and an albuterol inhaler. She notes worse symptoms and SOB  Review of Systems   Constitutional:  Positive for chills and fatigue. Negative for fever. HENT:  Positive for congestion, postnasal drip, sinus pressure and sinus pain. Eyes: Negative. Respiratory:  Positive for cough, chest tightness, shortness of breath and wheezing. Cardiovascular: Negative. Gastrointestinal: Negative. Genitourinary: Negative. Musculoskeletal: Negative. Objective   Physical Exam  Constitutional:       General: She is not in acute distress. Appearance: Normal appearance. She is normal weight. She is not ill-appearing. HENT:      Head: Normocephalic and atraumatic. Right Ear: Tympanic membrane is erythematous. Left Ear: Tympanic membrane is erythematous. Nose: Congestion present. Right Turbinates: Swollen. Left Turbinates: Swollen. Mouth/Throat:      Lips: Pink. Mouth: Mucous membranes are moist.      Pharynx: Oropharynx is clear. Cardiovascular:      Rate and Rhythm: Normal rate and regular rhythm. Pulses: Normal pulses. Heart sounds: Normal heart sounds. Pulmonary:      Effort: Pulmonary effort is normal.      Breath sounds: Wheezing and rhonchi present. Comments: Bilateral lower lobes  Musculoskeletal:         General: Normal range of motion. Lymphadenopathy:      Cervical: Cervical adenopathy present. Skin:     General: Skin is warm and dry. Capillary Refill: Capillary refill takes less than 2 seconds. Neurological:      Mental Status: She is alert. An electronic signature was used to authenticate this note.     --Milagros ePrkins, APRN - CNP

## 2022-10-26 NOTE — TELEPHONE ENCOUNTER
Astrid Jaime, APRN - CNP   10/26/2022 11:23 AM EDT       Please let Theo Gunn know her chest x-ray is normal. Use the inhaler as needed and start the antibiotic today.        Notified pt of results

## 2022-11-28 ENCOUNTER — HOSPITAL ENCOUNTER (EMERGENCY)
Age: 37
Discharge: HOME | End: 2022-11-28
Payer: COMMERCIAL

## 2022-11-28 VITALS
HEART RATE: 95 BPM | SYSTOLIC BLOOD PRESSURE: 121 MMHG | RESPIRATION RATE: 18 BRPM | DIASTOLIC BLOOD PRESSURE: 73 MMHG | OXYGEN SATURATION: 100 % | TEMPERATURE: 98.1 F

## 2022-11-28 DIAGNOSIS — J02.0 STREP PHARYNGITIS: Primary | ICD-10-CM

## 2022-11-28 LAB
GROUP A STREP CULTURE, REFLEX: POSITIVE
REFLEX THROAT C + S: NORMAL

## 2022-11-28 PROCEDURE — 99213 OFFICE O/P EST LOW 20 MIN: CPT

## 2022-11-28 PROCEDURE — 99213 OFFICE O/P EST LOW 20 MIN: CPT | Performed by: NURSE PRACTITIONER

## 2022-11-28 PROCEDURE — 87880 STREP A ASSAY W/OPTIC: CPT

## 2022-11-28 RX ORDER — ACETAMINOPHEN 500 MG
1000 TABLET ORAL EVERY 6 HOURS PRN
COMMUNITY

## 2022-11-28 RX ORDER — AMOXICILLIN 500 MG/1
500 CAPSULE ORAL 2 TIMES DAILY
Qty: 20 CAPSULE | Refills: 0 | Status: SHIPPED | OUTPATIENT
Start: 2022-11-28 | End: 2022-12-08

## 2022-11-28 RX ORDER — PREDNISONE 20 MG/1
20 TABLET ORAL DAILY
Qty: 5 TABLET | Refills: 0 | Status: SHIPPED | OUTPATIENT
Start: 2022-11-28 | End: 2022-12-03

## 2022-11-28 RX ORDER — IBUPROFEN 800 MG/1
800 TABLET ORAL EVERY 6 HOURS PRN
COMMUNITY

## 2022-11-28 ASSESSMENT — ENCOUNTER SYMPTOMS
NAUSEA: 0
COUGH: 0
ABDOMINAL PAIN: 0
SORE THROAT: 1
WHEEZING: 0
CONSTIPATION: 0
RHINORRHEA: 0
VOMITING: 0
EYE PAIN: 0
DIARRHEA: 0
BLOOD IN STOOL: 0
SHORTNESS OF BREATH: 0

## 2022-11-28 ASSESSMENT — PAIN - FUNCTIONAL ASSESSMENT
PAIN_FUNCTIONAL_ASSESSMENT: ACTIVITIES ARE NOT PREVENTED
PAIN_FUNCTIONAL_ASSESSMENT: 0-10

## 2022-11-28 ASSESSMENT — PAIN DESCRIPTION - FREQUENCY: FREQUENCY: INTERMITTENT

## 2022-11-28 ASSESSMENT — PAIN DESCRIPTION - LOCATION: LOCATION: THROAT

## 2022-11-28 ASSESSMENT — PAIN DESCRIPTION - DESCRIPTORS: DESCRIPTORS: SHARP

## 2022-11-28 ASSESSMENT — PAIN DESCRIPTION - PAIN TYPE: TYPE: ACUTE PAIN

## 2022-11-28 ASSESSMENT — PAIN DESCRIPTION - ONSET: ONSET: GRADUAL

## 2022-11-28 ASSESSMENT — PAIN SCALES - GENERAL: PAINLEVEL_OUTOF10: 8

## 2022-11-28 NOTE — Clinical Note
Elieser Marie was seen and treated in our emergency department on 11/28/2022. She may return to work on 11/30/2022. If you have any questions or concerns, please don't hesitate to call.       Libertad Pyle, APRN - CNP

## 2022-11-28 NOTE — ED NOTES
Patient discharge instructions given to pt and pt verbalized understanding, 2 px given, no other needs at this time, and pt left in stable condition.      Jesi Sorensen RN  11/28/22 8398

## 2022-11-28 NOTE — DISCHARGE INSTRUCTIONS
Go to ER for worsening symptoms, inability to swallow, inability to keep liquids down, inability to urinate for greater than 8 hours or difficulty breathing. Follow-up with your primary care provider. Increase oral intake. Warm salt water gargles after meals and at bedtime. May take tylenol or ibuprofen as needed for pain or fever.

## 2022-11-28 NOTE — ED PROVIDER NOTES
(WELLBUTRIN XL) 150 MG EXTENDED RELEASE TABLET    Take 1 tablet by mouth every morning    IBUPROFEN (ADVIL;MOTRIN) 800 MG TABLET    Take 800 mg by mouth every 6 hours as needed for Pain       ALLERGIES     Patient is has No Known Allergies. FAMILY HISTORY     Patient'sfamily history includes Breast Cancer (age of onset: 48) in her maternal grandmother; Heart Disease in her maternal grandfather; High Cholesterol in her maternal grandfather; Tatianna Grout in her paternal grandfather; No Known Problems in her father and mother. SOCIAL HISTORY     Patient  reports that she has never smoked. She has never used smokeless tobacco. She reports current alcohol use. She reports that she does not use drugs. PHYSICAL EXAM     ED TRIAGE VITALS  BP: 121/73, Temp: 98.1 °F (36.7 °C), Heart Rate: 95, Resp: 18, SpO2: 100 %  Physical Exam  Vitals and nursing note reviewed. Constitutional:       Appearance: She is well-developed. HENT:      Head: Normocephalic and atraumatic. Mouth/Throat:      Pharynx: Pharyngeal swelling and posterior oropharyngeal erythema present. No oropharyngeal exudate. Tonsils: Tonsillar exudate present. No tonsillar abscesses. 2+ on the right. 2+ on the left. Eyes:      Conjunctiva/sclera: Conjunctivae normal.      Pupils: Pupils are equal, round, and reactive to light. Cardiovascular:      Rate and Rhythm: Normal rate and regular rhythm. Heart sounds: Normal heart sounds. No murmur heard. No gallop. Pulmonary:      Effort: Pulmonary effort is normal. No respiratory distress. Breath sounds: Normal breath sounds. No wheezing or rales. Abdominal:      General: Bowel sounds are normal.      Palpations: Abdomen is soft. Musculoskeletal:         General: Normal range of motion. Cervical back: Normal range of motion. Skin:     General: Skin is warm and dry. Neurological:      Mental Status: She is alert and oriented to person, place, and time.        DIAGNOSTIC RESULTS   Labs:  Results for orders placed or performed during the hospital encounter of 11/28/22   STREP A ANTIGEN   Result Value Ref Range    GROUP A STREP CULTURE, REFLEX Positive (A)        IMAGING:  No orders to display     URGENT CARE COURSE:        MDM      Patient presents to urgent care with complaint of Fever and chills 2 days ago that progressed into a sore throat. Differential diagnosis include not limited to  strep throat or viral illness. Patient tests positive for strep throat. Will treat with oral amoxicillin and oral prednisone. Patient follow-up with primary care provider. Patient instructed to go to ER for worsening symptoms, inability to swallow, inability to keep liquids down, inability to urinate for greater than 8 hours or difficulty breathing. Follow-up with your primary care provider. Increase oral intake. Warm salt water gargles after meals and at bedtime. May take tylenol or ibuprofen as needed for pain or fever. Medications - No data to display  PROCEDURES:    Procedures    FINALIMPRESSION      1.  Strep pharyngitis        DISPOSITION/PLAN   DISPOSITION Decision To Discharge 11/28/2022 11:01:48 AM    PATIENT REFERRED TO:  ELYSSA Negron CNP  [address]  [phone]    In 2 days    DISCHARGE MEDICATIONS:  New Prescriptions    AMOXICILLIN (AMOXIL) 500 MG CAPSULE    Take 1 capsule by mouth 2 times daily for 10 days    PREDNISONE (DELTASONE) 20 MG TABLET    Take 1 tablet by mouth daily for 5 days     Current Discharge Medication List          ELYSSA Anthony CNP, APRN - CNP  11/28/22 1750

## 2022-12-06 ENCOUNTER — OFFICE VISIT (OUTPATIENT)
Dept: FAMILY MEDICINE CLINIC | Age: 37
End: 2022-12-06
Payer: COMMERCIAL

## 2022-12-06 ENCOUNTER — HOSPITAL ENCOUNTER (OUTPATIENT)
Age: 37
Discharge: HOME | End: 2022-12-06
Payer: COMMERCIAL

## 2022-12-06 VITALS
WEIGHT: 154 LBS | DIASTOLIC BLOOD PRESSURE: 72 MMHG | SYSTOLIC BLOOD PRESSURE: 116 MMHG | HEART RATE: 88 BPM | BODY MASS INDEX: 24.12 KG/M2 | OXYGEN SATURATION: 100 %

## 2022-12-06 DIAGNOSIS — R53.83 FATIGUE, UNSPECIFIED TYPE: ICD-10-CM

## 2022-12-06 DIAGNOSIS — Z86.32 HISTORY OF GESTATIONAL DIABETES: ICD-10-CM

## 2022-12-06 DIAGNOSIS — F41.9 ANXIETY AND DEPRESSION: ICD-10-CM

## 2022-12-06 DIAGNOSIS — Z00.00 WELL ADULT EXAM: Primary | ICD-10-CM

## 2022-12-06 DIAGNOSIS — F32.A ANXIETY AND DEPRESSION: ICD-10-CM

## 2022-12-06 LAB
ALBUMIN SERPL-MCNC: 4.3 G/DL (ref 3.5–5.1)
ALP BLD-CCNC: 53 U/L (ref 38–126)
ALT SERPL-CCNC: 9 U/L (ref 11–66)
ANION GAP SERPL CALCULATED.3IONS-SCNC: 11 MEQ/L (ref 8–16)
AST SERPL-CCNC: 12 U/L (ref 5–40)
AVERAGE GLUCOSE: 96 MG/DL (ref 70–126)
BASOPHILS # BLD: 0.6 %
BASOPHILS ABSOLUTE: 0 THOU/MM3 (ref 0–0.1)
BILIRUB SERPL-MCNC: 0.3 MG/DL (ref 0.3–1.2)
BUN BLDV-MCNC: 16 MG/DL (ref 7–22)
CALCIUM SERPL-MCNC: 9.5 MG/DL (ref 8.5–10.5)
CHLORIDE BLD-SCNC: 103 MEQ/L (ref 98–111)
CO2: 26 MEQ/L (ref 23–33)
CREAT SERPL-MCNC: 0.7 MG/DL (ref 0.4–1.2)
EOSINOPHIL # BLD: 4.1 %
EOSINOPHILS ABSOLUTE: 0.2 THOU/MM3 (ref 0–0.4)
ERYTHROCYTE [DISTWIDTH] IN BLOOD BY AUTOMATED COUNT: 12.3 % (ref 11.5–14.5)
ERYTHROCYTE [DISTWIDTH] IN BLOOD BY AUTOMATED COUNT: 40.6 FL (ref 35–45)
GFR SERPL CREATININE-BSD FRML MDRD: > 60 ML/MIN/1.73M2
GLUCOSE BLD-MCNC: 95 MG/DL (ref 70–108)
HBA1C MFR BLD: 5.2 % (ref 4.4–6.4)
HCT VFR BLD CALC: 39.8 % (ref 37–47)
HEMOGLOBIN: 13.3 GM/DL (ref 12–16)
IMMATURE GRANS (ABS): 0.02 THOU/MM3 (ref 0–0.07)
IMMATURE GRANULOCYTES: 0.4 %
LYMPHOCYTES # BLD: 39.6 %
LYMPHOCYTES ABSOLUTE: 1.9 THOU/MM3 (ref 1–4.8)
MCH RBC QN AUTO: 30.2 PG (ref 26–33)
MCHC RBC AUTO-ENTMCNC: 33.4 GM/DL (ref 32.2–35.5)
MCV RBC AUTO: 90.2 FL (ref 81–99)
MONOCYTES # BLD: 7 %
MONOCYTES ABSOLUTE: 0.3 THOU/MM3 (ref 0.4–1.3)
NUCLEATED RED BLOOD CELLS: 0 /100 WBC
PLATELET # BLD: 258 THOU/MM3 (ref 130–400)
PMV BLD AUTO: 10.5 FL (ref 9.4–12.4)
POTASSIUM SERPL-SCNC: 4.1 MEQ/L (ref 3.5–5.2)
RBC # BLD: 4.41 MILL/MM3 (ref 4.2–5.4)
SEG NEUTROPHILS: 48.3 %
SEGMENTED NEUTROPHILS ABSOLUTE COUNT: 2.4 THOU/MM3 (ref 1.8–7.7)
SODIUM BLD-SCNC: 140 MEQ/L (ref 135–145)
TOTAL PROTEIN: 6.8 G/DL (ref 6.1–8)
TSH SERPL DL<=0.05 MIU/L-ACNC: 0.53 UIU/ML (ref 0.4–4.2)
VITAMIN D 25-HYDROXY: 35 NG/ML (ref 30–100)
WBC # BLD: 4.9 THOU/MM3 (ref 4.8–10.8)

## 2022-12-06 PROCEDURE — 99395 PREV VISIT EST AGE 18-39: CPT | Performed by: NURSE PRACTITIONER

## 2022-12-06 PROCEDURE — 85025 COMPLETE CBC W/AUTO DIFF WBC: CPT

## 2022-12-06 PROCEDURE — 84443 ASSAY THYROID STIM HORMONE: CPT

## 2022-12-06 PROCEDURE — 82306 VITAMIN D 25 HYDROXY: CPT

## 2022-12-06 PROCEDURE — 80053 COMPREHEN METABOLIC PANEL: CPT

## 2022-12-06 PROCEDURE — 36415 COLL VENOUS BLD VENIPUNCTURE: CPT

## 2022-12-06 PROCEDURE — 83036 HEMOGLOBIN GLYCOSYLATED A1C: CPT

## 2022-12-06 RX ORDER — CITALOPRAM 20 MG/1
20 TABLET ORAL EVERY MORNING
Qty: 30 TABLET | Refills: 0 | Status: SHIPPED | OUTPATIENT
Start: 2022-12-06 | End: 2023-01-05

## 2022-12-06 RX ORDER — BUPROPION HYDROCHLORIDE 100 MG/1
TABLET ORAL
Qty: 11 TABLET | Refills: 0 | Status: SHIPPED | OUTPATIENT
Start: 2022-12-06 | End: 2022-12-20

## 2022-12-06 ASSESSMENT — ENCOUNTER SYMPTOMS
ABDOMINAL PAIN: 0
BLOOD IN STOOL: 0
EYE REDNESS: 0
COLOR CHANGE: 0
SHORTNESS OF BREATH: 0
WHEEZING: 0
BACK PAIN: 0
CONSTIPATION: 0
DIARRHEA: 0
EYE ITCHING: 0
EYE PAIN: 0
SINUS PRESSURE: 0
EYE DISCHARGE: 0
COUGH: 0

## 2022-12-06 NOTE — PROGRESS NOTES
[facility]  [facility]  [address] [facility]  Dept: [phone]  Dept Fax: [phone]: [phone]     Visit Date:  12/6/2022    Patient:  Monika Knight  YOB: 1985  Age: 30 y.o. Gender: female  BMI: Body mass index is 24.12 kg/m². Monika Knight, Established patient, is being seen today for   Chief Complaint   Patient presents with    Annual Exam     Fatigue, would like labs done, concerned w DM singe having gestational DM   . Assessment/Plan      1. Well adult exam  - Age-appropriate education provided. - Health maintenance reviewed. - Encourage healthy diet and regular exercise. 2. Fatigue, unspecified type  - Likely multifactorial  - Will further evaluate with blood work  - Uncontrolled anxiety likely contributing  - CBC with Auto Differential; Future  - Comprehensive Metabolic Panel; Future  - TSH with Reflex; Future  - Vitamin D 25 Hydroxy; Future    3. Anxiety and depression  - Chronic, uncontrolled  - Stop Wellbutrin, restart citalopram for treatment of anxiety and depression  - Taper off Wellbutrin as prescribed  - Follow up in 1 month  - citalopram (CELEXA) 20 MG tablet; Take 1 tablet by mouth every morning  Dispense: 30 tablet; Refill: 0  - buPROPion (WELLBUTRIN) 100 MG tablet; Take 0.5 tablets by mouth 2 times daily for 7 days, THEN 0.5 tablets daily for 7 days. Dispense: 11 tablet; Refill: 0    5. History of gestational diabetes  - Hemoglobin A1C; Future    Return in about 1 month (around 1/6/2023) for Anxiety. HPI:     New patient. Health maintenance reviewed. Recent fatigue. Sleeping approximately 6 hours nightly. Some snoring. Denies being informed of witnessed apneic periods. Reports stability in depressive symptoms but worsening anxiety symptoms. Does not feel Wellbutrin is helping.  Previously successfully treated with citalopram.     PHQ Scores 3/29/2022   PHQ2 Score 0   PHQ9 Score 0     Interpretation of Total Score Depression Severity: 1-4 = Minimal depression, 5-9 = Mild depression, 10-14 = Moderate depression, 15-19 = Moderately severe depression, 20-27 = Severe depression    Medications    Current Outpatient Medications:     citalopram (CELEXA) 20 MG tablet, Take 1 tablet by mouth every morning, Disp: 30 tablet, Rfl: 0    buPROPion (WELLBUTRIN) 100 MG tablet, Take 0.5 tablets by mouth 2 times daily for 7 days, THEN 0.5 tablets daily for 7 days. , Disp: 11 tablet, Rfl: 0    ibuprofen (ADVIL;MOTRIN) 800 MG tablet, Take 800 mg by mouth every 6 hours as needed for Pain, Disp: , Rfl:     acetaminophen (TYLENOL) 500 MG tablet, Take 1,000 mg by mouth every 6 hours as needed for Pain, Disp: , Rfl:     The patient has No Known Allergies. Past Medical History  Chan Diaz  has a past medical history of Anxiety and Depression. Past Surgical History  The patient  has a past surgical history that includes  section and [place] tooth extraction. Family History  This patient's family history includes Breast Cancer (age of onset: 48) in her maternal grandmother; Heart Disease in her maternal grandfather; High Cholesterol in her maternal grandfather; [place] in her paternal grandfather; No Known Problems in her father and mother. Social History  Chan Diaz  reports that she has never smoked. She has never used smokeless tobacco. She reports current alcohol use of about 1.0 standard drink per week. She reports that she does not use drugs. Health Maintenance:    Health maintenance reviewed.    Health Maintenance   Topic Date Due    Varicella vaccine (1 of 2 - 2-dose childhood series) Never done    HIV screen  Never done    Hepatitis C screen  Never done    COVID-19 Vaccine (2 - Booster for Janay series) 10/26/2021    Depression Monitoring  2023    Cervical cancer screen  10/27/2024    DTaP/Tdap/Td vaccine (3 - Td or Tdap) 2030    Flu vaccine  Completed    Hepatitis A vaccine  Aged Out    Hib vaccine  Aged Out    Meningococcal (ACWY) vaccine  Aged Out    Pneumococcal 0-64 years Vaccine  Aged Out       Subjective/Objective:      Review of Systems   Constitutional:  Negative for chills, fatigue and fever. HENT:  Negative for congestion, ear pain, sinus pressure and sneezing. Eyes:  Negative for pain, discharge, redness and itching. Respiratory:  Negative for cough, shortness of breath and wheezing. Cardiovascular:  Negative for chest pain, palpitations and leg swelling. Gastrointestinal:  Negative for abdominal pain, blood in stool, constipation and diarrhea. Endocrine: Positive for polyuria. Negative for polydipsia and polyphagia. Genitourinary:  Negative for difficulty urinating and hematuria. Musculoskeletal:  Negative for arthralgias, back pain and neck pain. Skin:  Negative for color change, pallor and rash. Allergic/Immunologic: Negative for environmental allergies and food allergies. Neurological:  Negative for dizziness, light-headedness, numbness and headaches. Psychiatric/Behavioral:  Negative for agitation, confusion, dysphoric mood and sleep disturbance. The patient is nervous/anxious. /72   Pulse 88   Wt 154 lb (69.9 kg)   LMP 11/30/2022 (Exact Date)   SpO2 100%   BMI 24.12 kg/m²     Physical Exam  Psychiatric:         Mood and Affect: Mood is anxious. Mood is not depressed. Labs Reviewed 12/6/2022:    Lab Results   Component Value Date    CHOL 187 08/16/2022    TRIG 85 08/16/2022    HDL 53 08/16/2022           Patient given educational materials - see patient instructions. Discussed use, benefit, and side effects of prescribed medications. All patient questions answered. Pt voiced understanding. Reviewed health maintenance.        Electronically signed by ELYSSA Tapia CNP on 12/6/2022 at 9:27 AM EST

## 2022-12-14 DIAGNOSIS — F41.9 ANXIETY AND DEPRESSION: ICD-10-CM

## 2022-12-14 DIAGNOSIS — F32.A ANXIETY AND DEPRESSION: ICD-10-CM

## 2022-12-15 RX ORDER — BUPROPION HYDROCHLORIDE 100 MG/1
TABLET ORAL
Qty: 11 TABLET | Refills: 0 | OUTPATIENT
Start: 2022-12-15

## 2022-12-15 NOTE — TELEPHONE ENCOUNTER
This medication refill is regarding a electronic request. Refill requested by  [employer] . Requested Prescriptions     Pending Prescriptions Disp Refills    buPROPion (WELLBUTRIN) 100 MG tablet [Pharmacy Med Name: BUPROPION  MG TABLET] 11 tablet 0     Sig: take 1/2 tablet by mouth twice a day for 7 days then 1/2 tablet once daily for 7 days       Date of last visit: 12/6/2022   Date of next visit: None  Date of last refill: 12/6/2022  11/0    Rx verified, ordered and set to EP.

## 2022-12-28 DIAGNOSIS — F32.A ANXIETY AND DEPRESSION: ICD-10-CM

## 2022-12-28 DIAGNOSIS — F41.9 ANXIETY AND DEPRESSION: ICD-10-CM

## 2022-12-29 RX ORDER — CITALOPRAM 20 MG/1
20 TABLET ORAL EVERY MORNING
Qty: 30 TABLET | Refills: 0 | OUTPATIENT
Start: 2022-12-29 | End: 2023-01-28

## 2023-01-07 DIAGNOSIS — F41.9 ANXIETY AND DEPRESSION: ICD-10-CM

## 2023-01-07 DIAGNOSIS — F32.A ANXIETY AND DEPRESSION: ICD-10-CM

## 2023-01-09 DIAGNOSIS — F32.A ANXIETY AND DEPRESSION: ICD-10-CM

## 2023-01-09 DIAGNOSIS — F41.9 ANXIETY AND DEPRESSION: ICD-10-CM

## 2023-01-09 RX ORDER — CITALOPRAM 20 MG/1
20 TABLET ORAL EVERY MORNING
Qty: 30 TABLET | Refills: 0 | OUTPATIENT
Start: 2023-01-09 | End: 2023-02-08

## 2023-01-09 NOTE — TELEPHONE ENCOUNTER
Patient called in stating that she had stopped the [facility] the last time she saw you and started that [address] 20 MG and she is trying to get a refill but it is keeps saying it is denied, please send to [facility] in [place].

## 2023-01-09 NOTE — TELEPHONE ENCOUNTER
Per last appointment-  Return in about 1 month (around 1/6/2023) for Anxiety. Please reach out to patient to schedule. If she is needing short term supply, let us know.

## 2023-01-10 RX ORDER — CITALOPRAM 20 MG/1
20 TABLET ORAL EVERY MORNING
Qty: 7 TABLET | Refills: 0 | Status: SHIPPED | OUTPATIENT
Start: 2023-01-10

## 2023-01-10 NOTE — TELEPHONE ENCOUNTER
Appointment made for this Thursday, she is completley out so she would like the short supply sent in today if at all possibly. Thank you!

## 2023-01-10 NOTE — TELEPHONE ENCOUNTER
Mynor Khan called requesting a refill on the following medications:  Requested Prescriptions     Pending Prescriptions Disp Refills    citalopram (CELEXA) 20 MG tablet 30 tablet 0     Sig: Take 1 tablet by mouth every morning       Date of last visit: 12/6/2022  Date of next visit (if applicable):1/12/2023  Date of last refill: 12/6/22  [facility]arm[facility] Name: [facility]  .

## 2023-01-12 DIAGNOSIS — F32.A ANXIETY AND DEPRESSION: ICD-10-CM

## 2023-01-12 DIAGNOSIS — F41.9 ANXIETY AND DEPRESSION: ICD-10-CM

## 2023-01-12 RX ORDER — CITALOPRAM 20 MG/1
20 TABLET ORAL EVERY MORNING
Qty: 7 TABLET | Refills: 0 | OUTPATIENT
Start: 2023-01-12

## 2023-01-17 ENCOUNTER — TELEMEDICINE (OUTPATIENT)
Dept: FAMILY MEDICINE CLINIC | Age: 38
End: 2023-01-17
Payer: COMMERCIAL

## 2023-01-17 DIAGNOSIS — F41.9 ANXIETY AND DEPRESSION: Primary | ICD-10-CM

## 2023-01-17 DIAGNOSIS — F32.A ANXIETY AND DEPRESSION: Primary | ICD-10-CM

## 2023-01-17 PROCEDURE — 99213 OFFICE O/P EST LOW 20 MIN: CPT | Performed by: NURSE PRACTITIONER

## 2023-01-17 RX ORDER — CITALOPRAM 20 MG/1
20 TABLET ORAL EVERY MORNING
Qty: 90 TABLET | Refills: 1 | Status: SHIPPED | OUTPATIENT
Start: 2023-01-17 | End: 2023-07-16

## 2023-01-17 NOTE — PROGRESS NOTES
Susanne Rodriguez (:  1985) is a Established patient, here for evaluation of the following:    Assessment & Plan   Below is the assessment and plan developed based on review of pertinent history, physical exam, labs, studies, and medications. 1. Anxiety and depression  - Chronic, controlled  - Continue citalopram 20 mg daily  - Aerobic exercise (3-5x per week for 45-60 minutes at a time), relaxation techniques and positive activities encouraged  -     citalopram (CELEXA) 20 MG tablet; Take 1 tablet by mouth every morning, Disp-90 tablet, R-1Normal    Return in about 6 months (around 2023) for Depression, Anxiety. Jayme Johnson presents virtually for a 1 month follow up for anxiety and depression. Was tapered off of Wellbutrin following last appt while starting citalopram. Reports significant improvement in symptoms. Energy level has improved. Brain fog has resolved. \"I do not feel as medicated. \" Denies medication SE. Would like to continue with current treatment. No current concerns. Review of Systems   Constitutional:  Negative for fatigue. Psychiatric/Behavioral:  Negative for dysphoric mood. The patient is not nervous/anxious. Objective   Patient-Reported Vitals  Full set of vital signs was not obtained d/t lack of proper equipment.     [INSTRUCTIONS:  \"[x]\" Indicates a positive item  \"[]\" Indicates a negative item  -- DELETE ALL ITEMS NOT EXAMINED]    Constitutional: [x] Appears well-developed and well-nourished [x] No apparent distress      [] Abnormal -     Mental status: [x] Alert and awake  [x] Oriented to person/place/time [x] Able to follow commands    [] Abnormal -     Eyes:   EOM    [x]  Normal    [] Abnormal -   Sclera  [x]  Normal    [] Abnormal -          Discharge [x]  None visible   [] Abnormal -     HENT: [x] Normocephalic, atraumatic  [] Abnormal -   [x] Mouth/Throat: Mucous membranes are moist    External Ears [x] Normal  [] Abnormal -    Neck: [x] No visualized mass [] Abnormal -     Pulmonary/Chest: [x] Respiratory effort normal   [x] No visualized signs of difficulty breathing or respiratory distress        [] Abnormal -      Musculoskeletal:   [x] Normal gait with no signs of ataxia         [x] Normal range of motion of neck        [] Abnormal -     Neurological:        [x] No Facial Asymmetry (Cranial nerve 7 motor function) (limited exam due to video visit)          [x] No gaze palsy        [] Abnormal -          Skin:        [x] No significant exanthematous lesions or discoloration noted on facial skin         [] Abnormal -            Psychiatric:       [x] Normal Affect [] Abnormal -        [x] No Hallucinations    Other pertinent observable physical exam findings:-             Analisa De Los Santos, was evaluated through a synchronous (real-time) audio-video encounter. The patient (or guardian if applicable) is aware that this is a billable service, which includes applicable co-pays. This Virtual Visit was conducted with patient's (and/or legal guardian's) consent. The visit was conducted pursuant to the emergency declaration under the [address] [place] [address] authority and the [employer] and [employer] General Act. Patient identification was verified, and a caregiver was present when appropriate. The patient was located at Home: [address] [place] [address]. Provider was located at [place] [address] ([address] [place] [address]): [address] [place] [address], [facility]

## 2023-07-18 DIAGNOSIS — F41.9 ANXIETY AND DEPRESSION: ICD-10-CM

## 2023-07-18 DIAGNOSIS — F32.A ANXIETY AND DEPRESSION: ICD-10-CM

## 2023-07-18 RX ORDER — CITALOPRAM 20 MG/1
20 TABLET ORAL EVERY MORNING
Qty: 90 TABLET | Refills: 1 | OUTPATIENT
Start: 2023-07-18 | End: 2024-01-14

## 2023-07-18 NOTE — TELEPHONE ENCOUNTER
Called patient to set up appointment. No answer. Left message for her to call office to schedule appointment before she can get refills.

## 2023-07-25 ENCOUNTER — TELEMEDICINE (OUTPATIENT)
Dept: FAMILY MEDICINE CLINIC | Age: 38
End: 2023-07-25
Payer: COMMERCIAL

## 2023-07-25 DIAGNOSIS — F32.A ANXIETY AND DEPRESSION: Primary | ICD-10-CM

## 2023-07-25 DIAGNOSIS — F41.9 ANXIETY AND DEPRESSION: Primary | ICD-10-CM

## 2023-07-25 PROCEDURE — 99213 OFFICE O/P EST LOW 20 MIN: CPT | Performed by: NURSE PRACTITIONER

## 2023-07-25 RX ORDER — CITALOPRAM 20 MG/1
20 TABLET ORAL EVERY MORNING
Qty: 90 TABLET | Refills: 1 | Status: SHIPPED | OUTPATIENT
Start: 2023-07-25 | End: 2024-01-21

## 2023-07-25 ASSESSMENT — ANXIETY QUESTIONNAIRES
GAD7 TOTAL SCORE: 0
7. FEELING AFRAID AS IF SOMETHING AWFUL MIGHT HAPPEN: 0
6. BECOMING EASILY ANNOYED OR IRRITABLE: 0
4. TROUBLE RELAXING: 0
1. FEELING NERVOUS, ANXIOUS, OR ON EDGE: 0
2. NOT BEING ABLE TO STOP OR CONTROL WORRYING: 0
5. BEING SO RESTLESS THAT IT IS HARD TO SIT STILL: 0
3. WORRYING TOO MUCH ABOUT DIFFERENT THINGS: 0

## 2023-07-25 ASSESSMENT — PATIENT HEALTH QUESTIONNAIRE - PHQ9
SUM OF ALL RESPONSES TO PHQ QUESTIONS 1-9: 0
SUM OF ALL RESPONSES TO PHQ QUESTIONS 1-9: 0
2. FEELING DOWN, DEPRESSED OR HOPELESS: 0
SUM OF ALL RESPONSES TO PHQ QUESTIONS 1-9: 0
1. LITTLE INTEREST OR PLEASURE IN DOING THINGS: 0
SUM OF ALL RESPONSES TO PHQ QUESTIONS 1-9: 0
SUM OF ALL RESPONSES TO PHQ9 QUESTIONS 1 & 2: 0

## 2023-07-25 NOTE — PROGRESS NOTES
Maureen Mcdonald (:  1985) is a Established patient, here for evaluation of the following:    Assessment & Plan   Below is the assessment and plan developed based on review of pertinent history, physical exam, labs, studies, and medications. 1. Anxiety and depression  - Chronic, controlled  - Continue citalopram 20 mg daily  - Aerobic exercise (3-5x per week for 45-60 minutes at a time), relaxation techniques and positive activities encouraged  -     citalopram (CELEXA) 20 MG tablet; Take 1 tablet by mouth every morning, Disp-90 tablet, R-1Normal    Return in about 6 months (around 2024) for Annual Well Visit. Jignesh Haque presents virtually for a 6 month follow up for anxiety and depression. Continues to do well since change to citalopram. Anxiety and depression remain well-controlled. Denies medication SE. Would like to continue with current treatment. No current concerns. Anxiety and depression screenings are negative. Review of Systems   Constitutional:  Negative for fatigue. Psychiatric/Behavioral:  Negative for dysphoric mood. The patient is not nervous/anxious. Objective   Patient-Reported Vitals  Full set of vital signs was not obtained d/t lack of proper equipment.     [INSTRUCTIONS:  \"[x]\" Indicates a positive item  \"[]\" Indicates a negative item  -- DELETE ALL ITEMS NOT EXAMINED]    Constitutional: [x] Appears well-developed and well-nourished [x] No apparent distress      [] Abnormal -     Mental status: [x] Alert and awake  [x] Oriented to person/place/time [x] Able to follow commands    [] Abnormal -     Eyes:   EOM    [x]  Normal    [] Abnormal -   Sclera  [x]  Normal    [] Abnormal -          Discharge [x]  None visible   [] Abnormal -     HENT: [x] Normocephalic, atraumatic  [] Abnormal -   [x] Mouth/Throat: Mucous membranes are moist    External Ears [x] Normal  [] Abnormal -    Neck: [x] No visualized mass [] Abnormal -     Pulmonary/Chest: [x] Respiratory

## 2023-11-07 ENCOUNTER — NURSE ONLY (OUTPATIENT)
Dept: LAB | Age: 38
End: 2023-11-07

## 2023-11-12 LAB — CYTOLOGY THIN PREP PAP: NORMAL

## 2023-11-13 ENCOUNTER — HOSPITAL ENCOUNTER (EMERGENCY)
Age: 38
Discharge: HOME | End: 2023-11-13
Payer: COMMERCIAL

## 2023-11-13 VITALS
TEMPERATURE: 97.8 F | RESPIRATION RATE: 18 BRPM | HEART RATE: 98 BPM | DIASTOLIC BLOOD PRESSURE: 68 MMHG | OXYGEN SATURATION: 96 % | SYSTOLIC BLOOD PRESSURE: 107 MMHG

## 2023-11-13 DIAGNOSIS — J40 BRONCHITIS: Primary | ICD-10-CM

## 2023-11-13 PROCEDURE — 99213 OFFICE O/P EST LOW 20 MIN: CPT

## 2023-11-13 PROCEDURE — 99213 OFFICE O/P EST LOW 20 MIN: CPT | Performed by: NURSE PRACTITIONER

## 2023-11-13 RX ORDER — AMOXICILLIN AND CLAVULANATE POTASSIUM 875; 125 MG/1; MG/1
1 TABLET, FILM COATED ORAL 2 TIMES DAILY
Qty: 14 TABLET | Refills: 0 | Status: SHIPPED | OUTPATIENT
Start: 2023-11-13 | End: 2023-11-20

## 2023-11-13 RX ORDER — BENZONATATE 200 MG/1
200 CAPSULE ORAL 3 TIMES DAILY PRN
Qty: 30 CAPSULE | Refills: 0 | Status: SHIPPED | OUTPATIENT
Start: 2023-11-13 | End: 2023-11-20

## 2023-11-13 RX ORDER — PREDNISONE 20 MG/1
20 TABLET ORAL 2 TIMES DAILY
Qty: 10 TABLET | Refills: 0 | Status: SHIPPED | OUTPATIENT
Start: 2023-11-13 | End: 2023-11-18

## 2023-11-13 ASSESSMENT — PAIN SCALES - GENERAL: PAINLEVEL_OUTOF10: 3

## 2023-11-13 ASSESSMENT — PAIN - FUNCTIONAL ASSESSMENT
PAIN_FUNCTIONAL_ASSESSMENT: ACTIVITIES ARE NOT PREVENTED
PAIN_FUNCTIONAL_ASSESSMENT: 0-10

## 2023-11-13 ASSESSMENT — ENCOUNTER SYMPTOMS
SORE THROAT: 0
RHINORRHEA: 1
NAUSEA: 0
VOMITING: 0
SHORTNESS OF BREATH: 0
COUGH: 1
DIARRHEA: 0
CHEST TIGHTNESS: 1

## 2023-11-13 ASSESSMENT — PAIN DESCRIPTION - PAIN TYPE: TYPE: ACUTE PAIN

## 2023-11-13 ASSESSMENT — PAIN DESCRIPTION - ONSET: ONSET: GRADUAL

## 2023-11-13 ASSESSMENT — PAIN DESCRIPTION - FREQUENCY: FREQUENCY: INTERMITTENT

## 2023-11-13 ASSESSMENT — PAIN DESCRIPTION - LOCATION: LOCATION: CHEST

## 2023-11-13 ASSESSMENT — PAIN DESCRIPTION - DESCRIPTORS: DESCRIPTORS: TIGHTNESS

## 2023-11-13 NOTE — ED PROVIDER NOTES
START taking these medications    Details   predniSONE (DELTASONE) 20 MG tablet Take 1 tablet by mouth 2 times daily for 5 days, Disp-10 tablet, R-0Normal      amoxicillin-clavulanate (AUGMENTIN) 875-125 MG per tablet Take 1 tablet by mouth 2 times daily for 7 days, Disp-14 tablet, R-0Normal      benzonatate (TESSALON) 200 MG capsule Take 1 capsule by mouth 3 times daily as needed for Cough, Disp-30 capsule, R-0Normal             Discharge Medication List as of 11/13/2023  9:35 AM          Discharge Medication List as of 11/13/2023  9:35 AM          ELYSSA Galindo CNP    (Please note that portions of this note were completed with a voice recognition program. Efforts were made to edit the dictations but occasionally words are mis-transcribed.)           ELYSSA Galindo CNP  11/13/23 2272

## 2023-11-13 NOTE — ED NOTES
Patient in stable condition. Alert and oriented x3. Unlabored breathing present. Patient aware of plan of care. Patient discharge instructions given and explained. Follow up information instructions given. Prescription order explained to patient. Pharmacy with patient verified. Patient agreeable to plan of care. Patient states understanding and denies any questions or concerns. Patient ambulated out of UC with no complications.         Katrin Milligan RN  11/13/23 1031

## 2023-11-22 ENCOUNTER — HOSPITAL ENCOUNTER (OUTPATIENT)
Dept: MAMMOGRAPHY | Age: 38
Discharge: HOME | End: 2023-11-22
Payer: COMMERCIAL

## 2023-11-22 VITALS — WEIGHT: 154.1 LBS | HEIGHT: 67 IN | BODY MASS INDEX: 24.19 KG/M2

## 2023-11-22 DIAGNOSIS — Z12.31 VISIT FOR SCREENING MAMMOGRAM: ICD-10-CM

## 2023-11-22 LAB
DEPRECATED RDW RBC AUTO: 40.1 FL (ref 35–45)
ERYTHROCYTE [DISTWIDTH] IN BLOOD BY AUTOMATED COUNT: 12.2 % (ref 11.5–14.5)
HCT VFR BLD AUTO: 40.6 % (ref 37–47)
HGB BLD-MCNC: 14 GM/DL (ref 12–16)
MCH RBC QN AUTO: 32.3 PG (ref 26–33)
MCHC RBC AUTO-ENTMCNC: 34.5 GM/DL (ref 32.2–35.5)
MCV RBC AUTO: 93.5 FL (ref 81–99)
PLATELET # BLD AUTO: 287 THOU/MM3 (ref 130–400)
PMV BLD AUTO: 10.6 FL (ref 9.4–12.4)
RBC # BLD AUTO: 4.34 MILL/MM3 (ref 4.2–5.4)
TSH SERPL DL<=0.005 MIU/L-ACNC: 0.52 UIU/ML (ref 0.4–4.2)
WBC # BLD AUTO: 8.4 THOU/MM3 (ref 4.8–10.8)

## 2023-11-22 PROCEDURE — 84443 ASSAY THYROID STIM HORMONE: CPT

## 2023-11-22 PROCEDURE — 85027 COMPLETE CBC AUTOMATED: CPT

## 2023-11-22 PROCEDURE — 77063 BREAST TOMOSYNTHESIS BI: CPT

## 2023-11-22 PROCEDURE — 36415 COLL VENOUS BLD VENIPUNCTURE: CPT

## 2023-11-29 ENCOUNTER — NURSE ONLY (OUTPATIENT)
Dept: LAB | Age: 38
End: 2023-11-29

## 2024-01-26 DIAGNOSIS — F32.A ANXIETY AND DEPRESSION: ICD-10-CM

## 2024-01-26 DIAGNOSIS — F41.9 ANXIETY AND DEPRESSION: ICD-10-CM

## 2024-01-26 NOTE — TELEPHONE ENCOUNTER
Nataly Razo called requesting a refill on the following medications:  Requested Prescriptions     Pending Prescriptions Disp Refills    citalopram (CELEXA) 20 MG tablet [Pharmacy Med Name: CITALOPRAM HBR 20 MG TABLET] 90 tablet 1     Sig: take 1 tablet by mouth every morning       Date of last visit: 7/25/2023  Date of next visit (if applicable):Visit date not found  Date of last refill: 7/25/2023 90 with 1  Pharmacy Name: [facility]    Patient is due for 6 month follow up on this medication, please schedule. Check to see if needs short term supply.       Thanks,  Shirlene Laboy, CMA

## 2024-01-30 RX ORDER — CITALOPRAM 20 MG/1
20 TABLET ORAL EVERY MORNING
Qty: 90 TABLET | Refills: 1 | OUTPATIENT
Start: 2024-01-30 | End: 2024-07-28

## 2024-01-31 ENCOUNTER — TELEPHONE (OUTPATIENT)
Dept: FAMILY MEDICINE CLINIC | Age: 39
End: 2024-01-31

## 2024-01-31 DIAGNOSIS — F32.A ANXIETY AND DEPRESSION: ICD-10-CM

## 2024-01-31 DIAGNOSIS — F41.9 ANXIETY AND DEPRESSION: ICD-10-CM

## 2024-01-31 RX ORDER — CITALOPRAM 20 MG/1
20 TABLET ORAL EVERY MORNING
Qty: 30 TABLET | Refills: 0 | Status: SHIPPED | OUTPATIENT
Start: 2024-01-31 | End: 2024-02-01 | Stop reason: SDUPTHER

## 2024-01-31 NOTE — TELEPHONE ENCOUNTER
Nataly is in the office today. She came in thinking her appt was at 10:20am today and it was yesterday. She would like to schedule however I can't due to the no show policy for no show 3 times. Can we schedule?

## 2024-01-31 NOTE — TELEPHONE ENCOUNTER
3 no show's in 12 months, with letters sent for each previous no show. Unfortunately due to this, she has been dismissed. I have provided a 30 day supply of medication to get her through. Recommend establishing care with a new provider to continue her care.

## 2024-01-31 NOTE — TELEPHONE ENCOUNTER
Patient here in office. Talked with patient regarding no shows and dismissal from [facility]ers as PCP. Patient has decided to establish care with Tasneem Dent for PCP services. No certified letter sent.

## 2024-01-31 NOTE — TELEPHONE ENCOUNTER
Patient informed of meds called in and no longer having [facility]olo as her provider. She has chosen to schedule with Tasneme Dent.

## 2024-02-01 ENCOUNTER — OFFICE VISIT (OUTPATIENT)
Dept: FAMILY MEDICINE CLINIC | Age: 39
End: 2024-02-01
Payer: COMMERCIAL

## 2024-02-01 VITALS
HEART RATE: 79 BPM | OXYGEN SATURATION: 97 % | HEIGHT: 67 IN | TEMPERATURE: 97.3 F | DIASTOLIC BLOOD PRESSURE: 80 MMHG | SYSTOLIC BLOOD PRESSURE: 120 MMHG | BODY MASS INDEX: 25.52 KG/M2 | WEIGHT: 162.6 LBS | RESPIRATION RATE: 16 BRPM

## 2024-02-01 DIAGNOSIS — F41.9 ANXIETY AND DEPRESSION: Primary | ICD-10-CM

## 2024-02-01 DIAGNOSIS — G47.00 INSOMNIA, UNSPECIFIED TYPE: ICD-10-CM

## 2024-02-01 DIAGNOSIS — F32.A ANXIETY AND DEPRESSION: Primary | ICD-10-CM

## 2024-02-01 PROCEDURE — 99213 OFFICE O/P EST LOW 20 MIN: CPT | Performed by: NURSE PRACTITIONER

## 2024-02-01 RX ORDER — CITALOPRAM 20 MG/1
20 TABLET ORAL EVERY MORNING
Qty: 90 TABLET | Refills: 1 | Status: SHIPPED | OUTPATIENT
Start: 2024-02-01

## 2024-02-01 RX ORDER — CITALOPRAM 20 MG/1
20 TABLET ORAL EVERY MORNING
Qty: 30 TABLET | Refills: 0 | Status: SHIPPED | OUTPATIENT
Start: 2024-02-01 | End: 2024-02-01

## 2024-02-01 SDOH — ECONOMIC STABILITY: FOOD INSECURITY: WITHIN THE PAST 12 MONTHS, THE FOOD YOU BOUGHT JUST DIDN'T LAST AND YOU DIDN'T HAVE MONEY TO GET MORE.: NEVER TRUE

## 2024-02-01 SDOH — ECONOMIC STABILITY: FOOD INSECURITY: WITHIN THE PAST 12 MONTHS, YOU WORRIED THAT YOUR FOOD WOULD RUN OUT BEFORE YOU GOT MONEY TO BUY MORE.: NEVER TRUE

## 2024-02-01 SDOH — ECONOMIC STABILITY: INCOME INSECURITY: HOW HARD IS IT FOR YOU TO PAY FOR THE VERY BASICS LIKE FOOD, HOUSING, MEDICAL CARE, AND HEATING?: NOT HARD AT ALL

## 2024-02-01 SDOH — ECONOMIC STABILITY: HOUSING INSECURITY
IN THE LAST 12 MONTHS, WAS THERE A TIME WHEN YOU DID NOT HAVE A STEADY PLACE TO SLEEP OR SLEPT IN A SHELTER (INCLUDING NOW)?: NO

## 2024-02-01 ASSESSMENT — ENCOUNTER SYMPTOMS
SORE THROAT: 0
COUGH: 0
NAUSEA: 0
ABDOMINAL PAIN: 0
CHEST TIGHTNESS: 0
VOMITING: 0
SHORTNESS OF BREATH: 0
EYE PAIN: 0

## 2024-02-01 ASSESSMENT — PATIENT HEALTH QUESTIONNAIRE - PHQ9
SUM OF ALL RESPONSES TO PHQ QUESTIONS 1-9: 2
9. THOUGHTS THAT YOU WOULD BE BETTER OFF DEAD, OR OF HURTING YOURSELF: 0
2. FEELING DOWN, DEPRESSED OR HOPELESS: 0
SUM OF ALL RESPONSES TO PHQ9 QUESTIONS 1 & 2: 0
7. TROUBLE CONCENTRATING ON THINGS, SUCH AS READING THE NEWSPAPER OR WATCHING TELEVISION: 1
5. POOR APPETITE OR OVEREATING: 0
8. MOVING OR SPEAKING SO SLOWLY THAT OTHER PEOPLE COULD HAVE NOTICED. OR THE OPPOSITE, BEING SO FIGETY OR RESTLESS THAT YOU HAVE BEEN MOVING AROUND A LOT MORE THAN USUAL: 0
3. TROUBLE FALLING OR STAYING ASLEEP: 1
4. FEELING TIRED OR HAVING LITTLE ENERGY: 0
10. IF YOU CHECKED OFF ANY PROBLEMS, HOW DIFFICULT HAVE THESE PROBLEMS MADE IT FOR YOU TO DO YOUR WORK, TAKE CARE OF THINGS AT HOME, OR GET ALONG WITH OTHER PEOPLE: 0
SUM OF ALL RESPONSES TO PHQ QUESTIONS 1-9: 2
SUM OF ALL RESPONSES TO PHQ QUESTIONS 1-9: 2
1. LITTLE INTEREST OR PLEASURE IN DOING THINGS: 0
SUM OF ALL RESPONSES TO PHQ QUESTIONS 1-9: 2
6. FEELING BAD ABOUT YOURSELF - OR THAT YOU ARE A FAILURE OR HAVE LET YOURSELF OR YOUR FAMILY DOWN: 0

## 2024-02-01 NOTE — ASSESSMENT & PLAN NOTE
Uncontrolled, lifestyle modifications recommended and will let us know if sleep hygiene improvements do not help.

## 2024-02-01 NOTE — PROGRESS NOTES
[facility]  [address]  [facility] [address]  Dept: [phone]  Dept Fax: [phone]  Loc: [phone]  PROGRESS NOTE      Visit Date: 2024    Nataly Razo is a 38 y.o. female who presents today for:  Chief Complaint   Patient presents with    Discuss Medications     Med eval for celexa. Pt denies any other concerns today.        Subjective:  Patient presents for follow up on anxiety and depression. States Celexa is working well and denies any side effects. States she has been having some issues with falling asleep at night. Has tried melatonin but states it gave her crazy dreams and made her feel groggy in the morning. Does not want to try medication at this time.         Review of Systems   Constitutional:  Negative for chills and fever.   HENT:  Negative for congestion and sore throat.    Eyes:  Negative for pain and visual disturbance.   Respiratory:  Negative for cough, chest tightness and shortness of breath.    Cardiovascular:  Negative for chest pain and palpitations.   Gastrointestinal:  Negative for abdominal pain, nausea and vomiting.   Genitourinary:  Negative for decreased urine volume and dysuria.   Skin:  Negative for rash.   Neurological:  Negative for dizziness, weakness and headaches.   Psychiatric/Behavioral:  Positive for sleep disturbance. Negative for dysphoric mood. The patient is not nervous/anxious.      Patient Active Problem List   Diagnosis    Fibrocystic breast disease (FCBD) in female    Anxiety and depression    Axillary mass, right    History of loop electrical excision procedure (LEEP)    Insomnia     Past Medical History:   Diagnosis Date    Anxiety     Depression       Past Surgical History:   Procedure Laterality Date     SECTION      WISDOM TOOTH EXTRACTION       Family History   Problem Relation Age of Onset    No Known Problems Mother     No Known Problems Father     Breast Cancer

## 2024-02-24 ENCOUNTER — HOSPITAL ENCOUNTER (EMERGENCY)
Age: 39
Discharge: HOME | End: 2024-02-25
Attending: EMERGENCY MEDICINE
Payer: COMMERCIAL

## 2024-02-24 DIAGNOSIS — R51.9 NONINTRACTABLE HEADACHE, UNSPECIFIED CHRONICITY PATTERN, UNSPECIFIED HEADACHE TYPE: ICD-10-CM

## 2024-02-24 DIAGNOSIS — R42 LIGHTHEADEDNESS: Primary | ICD-10-CM

## 2024-02-24 LAB
BASOPHILS ABSOLUTE: 0.1 THOU/MM3 (ref 0–0.1)
BASOPHILS NFR BLD AUTO: 0.5 %
DEPRECATED RDW RBC AUTO: 40.1 FL (ref 35–45)
EOSINOPHIL NFR BLD AUTO: 3.4 %
EOSINOPHILS ABSOLUTE: 0.3 THOU/MM3 (ref 0–0.4)
ERYTHROCYTE [DISTWIDTH] IN BLOOD BY AUTOMATED COUNT: 12.1 % (ref 11.5–14.5)
HCT VFR BLD AUTO: 42.2 % (ref 37–47)
HGB BLD-MCNC: 14 GM/DL (ref 12–16)
IMM GRANULOCYTES # BLD AUTO: 0.01 THOU/MM3 (ref 0–0.07)
IMM GRANULOCYTES NFR BLD AUTO: 0.1 %
LYMPHOCYTES ABSOLUTE: 4.2 THOU/MM3 (ref 1–4.8)
LYMPHOCYTES NFR BLD AUTO: 41.6 %
MCH RBC QN AUTO: 30.1 PG (ref 26–33)
MCHC RBC AUTO-ENTMCNC: 33.2 GM/DL (ref 32.2–35.5)
MCV RBC AUTO: 90.8 FL (ref 81–99)
MONOCYTES ABSOLUTE: 0.5 THOU/MM3 (ref 0.4–1.3)
MONOCYTES NFR BLD AUTO: 5.1 %
NEUTROPHILS NFR BLD AUTO: 49.3 %
NRBC BLD AUTO-RTO: 0 /100 WBC
PLATELET # BLD AUTO: 219 THOU/MM3 (ref 130–400)
PMV BLD AUTO: 11.4 FL (ref 9.4–12.4)
RBC # BLD AUTO: 4.65 MILL/MM3 (ref 4.2–5.4)
SEGMENTED NEUTROPHILS ABSOLUTE COUNT: 5 THOU/MM3 (ref 1.8–7.7)
WBC # BLD AUTO: 10.1 THOU/MM3 (ref 4.8–10.8)

## 2024-02-24 PROCEDURE — 36415 COLL VENOUS BLD VENIPUNCTURE: CPT

## 2024-02-24 PROCEDURE — 96374 THER/PROPH/DIAG INJ IV PUSH: CPT

## 2024-02-24 PROCEDURE — 93005 ELECTROCARDIOGRAM TRACING: CPT

## 2024-02-24 PROCEDURE — 99285 EMERGENCY DEPT VISIT HI MDM: CPT

## 2024-02-24 PROCEDURE — 83880 ASSAY OF NATRIURETIC PEPTIDE: CPT

## 2024-02-24 PROCEDURE — 84484 ASSAY OF TROPONIN QUANT: CPT

## 2024-02-24 PROCEDURE — 80053 COMPREHEN METABOLIC PANEL: CPT

## 2024-02-24 PROCEDURE — 85025 COMPLETE CBC W/AUTO DIFF WBC: CPT

## 2024-02-25 ENCOUNTER — APPOINTMENT (OUTPATIENT)
Dept: GENERAL RADIOLOGY | Age: 39
End: 2024-02-25
Payer: COMMERCIAL

## 2024-02-25 ENCOUNTER — APPOINTMENT (OUTPATIENT)
Dept: CT IMAGING | Age: 39
End: 2024-02-25
Payer: COMMERCIAL

## 2024-02-25 VITALS
OXYGEN SATURATION: 98 % | DIASTOLIC BLOOD PRESSURE: 86 MMHG | RESPIRATION RATE: 17 BRPM | TEMPERATURE: 98.3 F | SYSTOLIC BLOOD PRESSURE: 126 MMHG | HEART RATE: 73 BPM

## 2024-02-25 LAB
ALBUMIN SERPL BCG-MCNC: 4.9 G/DL (ref 3.5–5.1)
ALP SERPL-CCNC: 49 U/L (ref 38–126)
ALT SERPL W/O P-5'-P-CCNC: 32 U/L (ref 11–66)
ANION GAP SERPL CALC-SCNC: 13 MEQ/L (ref 8–16)
AST SERPL-CCNC: 23 U/L (ref 5–40)
B-HCG SERPL QL: NEGATIVE
BILIRUB SERPL-MCNC: 0.2 MG/DL (ref 0.3–1.2)
BUN SERPL-MCNC: 10 MG/DL (ref 7–22)
CALCIUM SERPL-MCNC: 10.1 MG/DL (ref 8.5–10.5)
CHLORIDE SERPL-SCNC: 99 MEQ/L (ref 98–111)
CO2 SERPL-SCNC: 26 MEQ/L (ref 23–33)
CREAT SERPL-MCNC: 0.7 MG/DL (ref 0.4–1.2)
EKG ATRIAL RATE: 66 BPM
EKG ATRIAL RATE: 72 BPM
EKG P AXIS: 65 DEGREES
EKG P AXIS: 72 DEGREES
EKG P-R INTERVAL: 156 MS
EKG P-R INTERVAL: 160 MS
EKG Q-T INTERVAL: 382 MS
EKG Q-T INTERVAL: 406 MS
EKG QRS DURATION: 82 MS
EKG QRS DURATION: 84 MS
EKG QTC CALCULATION (BAZETT): 418 MS
EKG QTC CALCULATION (BAZETT): 425 MS
EKG R AXIS: 43 DEGREES
EKG R AXIS: 44 DEGREES
EKG T AXIS: 36 DEGREES
EKG T AXIS: 48 DEGREES
EKG VENTRICULAR RATE: 66 BPM
EKG VENTRICULAR RATE: 72 BPM
GFR SERPL CREATININE-BSD FRML MDRD: > 60 ML/MIN/1.73M2
GLUCOSE SERPL-MCNC: 104 MG/DL (ref 70–108)
NT-PROBNP SERPL IA-MCNC: 45.7 PG/ML (ref 0–124)
OSMOLALITY SERPL CALC.SUM OF ELEC: 275 MOSMOL/KG (ref 275–300)
POTASSIUM SERPL-SCNC: 3.8 MEQ/L (ref 3.5–5.2)
PROT SERPL-MCNC: 7.7 G/DL (ref 6.1–8)
SODIUM SERPL-SCNC: 138 MEQ/L (ref 135–145)
TROPONIN, HIGH SENSITIVITY: < 6 NG/L (ref 0–12)
TSH SERPL DL<=0.005 MIU/L-ACNC: 3.72 UIU/ML (ref 0.4–4.2)

## 2024-02-25 PROCEDURE — 93005 ELECTROCARDIOGRAM TRACING: CPT

## 2024-02-25 PROCEDURE — 70450 CT HEAD/BRAIN W/O DYE: CPT

## 2024-02-25 PROCEDURE — 84443 ASSAY THYROID STIM HORMONE: CPT

## 2024-02-25 PROCEDURE — 93010 ELECTROCARDIOGRAM REPORT: CPT | Performed by: INTERNAL MEDICINE

## 2024-02-25 PROCEDURE — 71045 X-RAY EXAM CHEST 1 VIEW: CPT

## 2024-02-25 PROCEDURE — 2580000003 HC RX 258

## 2024-02-25 PROCEDURE — 36415 COLL VENOUS BLD VENIPUNCTURE: CPT

## 2024-02-25 PROCEDURE — 84703 CHORIONIC GONADOTROPIN ASSAY: CPT

## 2024-02-25 RX ORDER — 0.9 % SODIUM CHLORIDE 0.9 %
1000 INTRAVENOUS SOLUTION INTRAVENOUS ONCE
Status: COMPLETED | OUTPATIENT
Start: 2024-02-25 | End: 2024-02-25

## 2024-02-25 RX ORDER — DIPHENHYDRAMINE HYDROCHLORIDE 50 MG/ML
25 INJECTION INTRAMUSCULAR; INTRAVENOUS ONCE
Status: DISCONTINUED | OUTPATIENT
Start: 2024-02-25 | End: 2024-02-25 | Stop reason: HOSPADM

## 2024-02-25 RX ORDER — PROCHLORPERAZINE EDISYLATE 5 MG/ML
10 INJECTION INTRAMUSCULAR; INTRAVENOUS ONCE
Status: DISCONTINUED | OUTPATIENT
Start: 2024-02-25 | End: 2024-02-25 | Stop reason: HOSPADM

## 2024-02-25 RX ADMIN — SODIUM CHLORIDE 1000 ML: 9 INJECTION, SOLUTION INTRAVENOUS at 00:30

## 2024-02-25 NOTE — DISCHARGE INSTRUCTIONS
Follow-up outpatient with your primary care physician.  Drink at least 2 L of water daily and continue monitoring for signs and symptoms of provoking factors.  Alternate between Tylenol and ibuprofen for migraines and other body aches.  If symptoms worsen, do not hesitate, to the ED.

## 2024-02-25 NOTE — ED NOTES
Patient resting in bed. Respirations easy and unlabored. No distress noted. Patient awaiting provider re-eval. Call light within reach.

## 2024-02-25 NOTE — ED PROVIDER NOTES
includes but is not limited to:  Syncope  Migraine  Subarachnoid hemorrhage  Stroke  Aortic dissection unlikely  Pulmonary embolism unlikely       Decision Rules/Clinical Scores utilized:  Not Applicable       Code Status:  Not addressed during this ED visit    Social determinants of health impacting treatment or disposition:  Considered and not applicable     MIPS documentation:  N/A    Medical Decision Making  38-year-old female with history of migraine comes ED complaining of sudden onset lightheadedness with near syncope.  Patient also endorsed headache, photophobia, and paresthesias.  Physical exam revealed unremarkable neurological testing.  Positive orthostatic vitals.  Patient received IV fluids and antiemetics with improvement of symptoms.  Patient refused a cocktail, stating feeling better after taking Tylenol prior to ED visit.  Cardiac workup unremarkable.  Patient subsequently discharged with instructions to follow-up outpatient with PCP.  Patient encouraged to return if symptoms worsened.          ED COURSE   ED Medications administered this visit (None if left blank):   Medications   prochlorperazine (COMPAZINE) injection 10 mg (10 mg IntraVENous Not Given 2/25/24 0032)   diphenhydrAMINE (BENADRYL) injection 25 mg (25 mg IntraVENous Not Given 2/25/24 0032)   sodium chloride 0.9 % bolus 1,000 mL (0 mLs IntraVENous Stopped 2/25/24 0246)         ED Course as of 02/25/24 0339   Sun Feb 25, 2024   0036 Positive orthostatics with diastolic drop greater than 10 mmHg.  IV fluids running. [PS]   0220 Patient states improvement of symptoms with IV fluids.  Lab findings discussed with patient.  Patient states understanding and agrees to follow-up outpatient with PCP. [PS]   0222 EKG demonstrated normal sinus rhythm with T wave inversions V1 through V3.  No ST segment changes.  No previous EKG to compare to.  Repeat EKG pending [PS]      ED Course User Index  [PS] Yaron Hodgson MD

## 2024-02-25 NOTE — ED NOTES
Patient presents to ED with chief complaint of chest pain/headache/dizziness. Patient also states that when she woke up this morning her left arm was numb. Patient then proceeded about her day, but tonight she felt her heart start racing and thought she was going to pass out. Patient resting in bed. Respirations easy and unlabored. No distress noted. Call light within reach.

## 2024-03-15 ENCOUNTER — OFFICE VISIT (OUTPATIENT)
Dept: FAMILY MEDICINE CLINIC | Age: 39
End: 2024-03-15

## 2024-03-15 VITALS
HEIGHT: 67 IN | BODY MASS INDEX: 25.9 KG/M2 | WEIGHT: 165 LBS | HEART RATE: 77 BPM | RESPIRATION RATE: 16 BRPM | TEMPERATURE: 98.2 F | DIASTOLIC BLOOD PRESSURE: 68 MMHG | OXYGEN SATURATION: 98 % | SYSTOLIC BLOOD PRESSURE: 116 MMHG

## 2024-03-15 DIAGNOSIS — R42 LIGHTHEADEDNESS: ICD-10-CM

## 2024-03-15 DIAGNOSIS — R51.9 NONINTRACTABLE HEADACHE, UNSPECIFIED CHRONICITY PATTERN, UNSPECIFIED HEADACHE TYPE: ICD-10-CM

## 2024-03-15 DIAGNOSIS — R73.03 PRE-DIABETES: ICD-10-CM

## 2024-03-15 DIAGNOSIS — Z09 HOSPITAL DISCHARGE FOLLOW-UP: Primary | ICD-10-CM

## 2024-03-15 LAB — HBA1C MFR BLD: 5.3 %

## 2024-03-15 RX ORDER — ALPRAZOLAM 0.5 MG/1
0.5 TABLET ORAL NIGHTLY PRN
COMMUNITY
Start: 2024-02-28

## 2024-03-15 NOTE — PROGRESS NOTES
MG tablet Take 2 tablets by mouth every 6 hours as needed for Pain          Medications patient taking as of now reconciled against medications ordered at time of hospital discharge: Yes    A comprehensive review of systems was negative except for what was noted in the HPI.    Objective:    /68 (Site: Left Upper Arm, Position: Sitting, Cuff Size: Medium Adult)   Pulse 77   Temp 98.2 °F (36.8 °C)   Resp 16   Ht 1.702 m (5' 7\")   Wt 74.8 kg (165 lb)   LMP  (LMP Unknown)   SpO2 98%   BMI 25.84 kg/m²   Physical Exam  Constitutional:       Appearance: Normal appearance. She is normal weight.   HENT:      Head: Normocephalic.   Cardiovascular:      Rate and Rhythm: Normal rate and regular rhythm.      Heart sounds: Normal heart sounds.   Pulmonary:      Effort: Pulmonary effort is normal.      Breath sounds: Normal breath sounds.   Musculoskeletal:         General: Normal range of motion.   Skin:     General: Skin is warm and dry.      Findings: No rash.   Neurological:      General: No focal deficit present.      Mental Status: She is alert and oriented to person, place, and time.   Psychiatric:         Mood and Affect: Mood normal.         Behavior: Behavior normal.         Thought Content: Thought content normal.         Judgment: Judgment normal.           An electronic signature was used to authenticate this note.  --Tasneem Dent, ELYSSA - CNP

## 2024-04-16 NOTE — PROGRESS NOTES
PAT call attempted, patient unavailable, left message to please call us back at your earliest convenience; [phone]

## 2024-04-17 ENCOUNTER — HOSPITAL ENCOUNTER (OUTPATIENT)
Age: 39
Discharge: HOME | End: 2024-04-17
Payer: COMMERCIAL

## 2024-04-17 LAB
BASOPHILS ABSOLUTE: 0 THOU/MM3 (ref 0–0.1)
BASOPHILS NFR BLD AUTO: 0.4 %
DEPRECATED RDW RBC AUTO: 40.4 FL (ref 35–45)
EOSINOPHIL NFR BLD AUTO: 2.5 %
EOSINOPHILS ABSOLUTE: 0.1 THOU/MM3 (ref 0–0.4)
ERYTHROCYTE [DISTWIDTH] IN BLOOD BY AUTOMATED COUNT: 12.3 % (ref 11.5–14.5)
HCT VFR BLD AUTO: 37.4 % (ref 37–47)
HGB BLD-MCNC: 12.5 GM/DL (ref 12–16)
IMM GRANULOCYTES # BLD AUTO: 0.01 THOU/MM3 (ref 0–0.07)
IMM GRANULOCYTES NFR BLD AUTO: 0.2 %
LYMPHOCYTES ABSOLUTE: 2 THOU/MM3 (ref 1–4.8)
LYMPHOCYTES NFR BLD AUTO: 39.7 %
MCH RBC QN AUTO: 30.3 PG (ref 26–33)
MCHC RBC AUTO-ENTMCNC: 33.4 GM/DL (ref 32.2–35.5)
MCV RBC AUTO: 90.8 FL (ref 81–99)
MONOCYTES ABSOLUTE: 0.2 THOU/MM3 (ref 0.4–1.3)
MONOCYTES NFR BLD AUTO: 3.7 %
NEUTROPHILS NFR BLD AUTO: 53.5 %
NRBC BLD AUTO-RTO: 0 /100 WBC
PLATELET # BLD AUTO: 192 THOU/MM3 (ref 130–400)
PMV BLD AUTO: 11.8 FL (ref 9.4–12.4)
RBC # BLD AUTO: 4.12 MILL/MM3 (ref 4.2–5.4)
SEGMENTED NEUTROPHILS ABSOLUTE COUNT: 2.7 THOU/MM3 (ref 1.8–7.7)
WBC # BLD AUTO: 5.1 THOU/MM3 (ref 4.8–10.8)

## 2024-04-17 PROCEDURE — 85025 COMPLETE CBC W/AUTO DIFF WBC: CPT

## 2024-04-17 PROCEDURE — 36415 COLL VENOUS BLD VENIPUNCTURE: CPT

## 2024-04-22 ENCOUNTER — ANESTHESIA EVENT (OUTPATIENT)
Dept: OPERATING ROOM | Age: 39
End: 2024-04-22
Payer: COMMERCIAL

## 2024-04-22 NOTE — PROGRESS NOTES
Patient instructed not to eat or drink anything after midnight the day before surgery.    Please bring list of medications with the dosages & when you take them.  If you do not  have a list bring the medications bottles with you.    If having a MAC or general anesthetic you MUST have a .    Bring photo ID & insurance information.    Leave jewelry (watch ,rings, peircings) & other valuables, including extra cash, at home.    Wear comfortable clean clothing.    When showering or bathing the night before & morning of surgery please use  antibacterial soap.    Follow any instructions from Dr. Rob office.

## 2024-04-23 ENCOUNTER — ANESTHESIA (OUTPATIENT)
Dept: OPERATING ROOM | Age: 39
End: 2024-04-23
Payer: COMMERCIAL

## 2024-04-23 ENCOUNTER — HOSPITAL ENCOUNTER (OUTPATIENT)
Age: 39
Setting detail: OUTPATIENT SURGERY
Discharge: HOME | End: 2024-04-23
Attending: OBSTETRICS & GYNECOLOGY | Admitting: OBSTETRICS & GYNECOLOGY
Payer: COMMERCIAL

## 2024-04-23 VITALS
OXYGEN SATURATION: 96 % | WEIGHT: 161.8 LBS | HEART RATE: 60 BPM | HEIGHT: 67 IN | BODY MASS INDEX: 25.39 KG/M2 | SYSTOLIC BLOOD PRESSURE: 120 MMHG | DIASTOLIC BLOOD PRESSURE: 80 MMHG | RESPIRATION RATE: 14 BRPM | TEMPERATURE: 97.4 F

## 2024-04-23 DIAGNOSIS — N92.0 MENORRHAGIA WITH REGULAR CYCLE: ICD-10-CM

## 2024-04-23 PROCEDURE — 7100000010 HC PHASE II RECOVERY - FIRST 15 MIN: Performed by: OBSTETRICS & GYNECOLOGY

## 2024-04-23 PROCEDURE — 6370000000 HC RX 637 (ALT 250 FOR IP)

## 2024-04-23 PROCEDURE — 3700000000 HC ANESTHESIA ATTENDED CARE: Performed by: OBSTETRICS & GYNECOLOGY

## 2024-04-23 PROCEDURE — 88305 TISSUE EXAM BY PATHOLOGIST: CPT

## 2024-04-23 PROCEDURE — 2709999900 HC NON-CHARGEABLE SUPPLY: Performed by: OBSTETRICS & GYNECOLOGY

## 2024-04-23 PROCEDURE — 7100000000 HC PACU RECOVERY - FIRST 15 MIN: Performed by: OBSTETRICS & GYNECOLOGY

## 2024-04-23 PROCEDURE — 2500000003 HC RX 250 WO HCPCS: Performed by: NURSE ANESTHETIST, CERTIFIED REGISTERED

## 2024-04-23 PROCEDURE — 3700000001 HC ADD 15 MINUTES (ANESTHESIA): Performed by: OBSTETRICS & GYNECOLOGY

## 2024-04-23 PROCEDURE — 7100000001 HC PACU RECOVERY - ADDTL 15 MIN: Performed by: OBSTETRICS & GYNECOLOGY

## 2024-04-23 PROCEDURE — 3600000003 HC SURGERY LEVEL 3 BASE: Performed by: OBSTETRICS & GYNECOLOGY

## 2024-04-23 PROCEDURE — 6370000000 HC RX 637 (ALT 250 FOR IP): Performed by: OBSTETRICS & GYNECOLOGY

## 2024-04-23 PROCEDURE — 7100000011 HC PHASE II RECOVERY - ADDTL 15 MIN: Performed by: OBSTETRICS & GYNECOLOGY

## 2024-04-23 PROCEDURE — 3600000013 HC SURGERY LEVEL 3 ADDTL 15MIN: Performed by: OBSTETRICS & GYNECOLOGY

## 2024-04-23 PROCEDURE — 6360000002 HC RX W HCPCS

## 2024-04-23 PROCEDURE — C1886 CATHETER, ABLATION: HCPCS | Performed by: OBSTETRICS & GYNECOLOGY

## 2024-04-23 PROCEDURE — 2580000003 HC RX 258: Performed by: NURSE ANESTHETIST, CERTIFIED REGISTERED

## 2024-04-23 PROCEDURE — 6360000002 HC RX W HCPCS: Performed by: NURSE ANESTHETIST, CERTIFIED REGISTERED

## 2024-04-23 RX ORDER — PROPOFOL 10 MG/ML
INJECTION, EMULSION INTRAVENOUS PRN
Status: DISCONTINUED | OUTPATIENT
Start: 2024-04-23 | End: 2024-04-23 | Stop reason: SDUPTHER

## 2024-04-23 RX ORDER — SODIUM CHLORIDE 0.9 % (FLUSH) 0.9 %
5-40 SYRINGE (ML) INJECTION EVERY 12 HOURS SCHEDULED
Status: DISCONTINUED | OUTPATIENT
Start: 2024-04-23 | End: 2024-04-23 | Stop reason: HOSPADM

## 2024-04-23 RX ORDER — IBUPROFEN 800 MG/1
800 TABLET ORAL EVERY 8 HOURS PRN
Status: DISCONTINUED | OUTPATIENT
Start: 2024-04-23 | End: 2024-04-23 | Stop reason: HOSPADM

## 2024-04-23 RX ORDER — MORPHINE SULFATE 2 MG/ML
2 INJECTION, SOLUTION INTRAMUSCULAR; INTRAVENOUS EVERY 5 MIN PRN
Status: DISCONTINUED | OUTPATIENT
Start: 2024-04-23 | End: 2024-04-23 | Stop reason: HOSPADM

## 2024-04-23 RX ORDER — OXYCODONE HYDROCHLORIDE 5 MG/1
5 TABLET ORAL EVERY 4 HOURS PRN
Status: DISCONTINUED | OUTPATIENT
Start: 2024-04-23 | End: 2024-04-23 | Stop reason: HOSPADM

## 2024-04-23 RX ORDER — SCOLOPAMINE TRANSDERMAL SYSTEM 1 MG/1
PATCH, EXTENDED RELEASE TRANSDERMAL
Status: DISCONTINUED
Start: 2024-04-23 | End: 2024-04-23 | Stop reason: HOSPADM

## 2024-04-23 RX ORDER — ONDANSETRON 2 MG/ML
4 INJECTION INTRAMUSCULAR; INTRAVENOUS EVERY 6 HOURS PRN
Status: DISCONTINUED | OUTPATIENT
Start: 2024-04-23 | End: 2024-04-23 | Stop reason: HOSPADM

## 2024-04-23 RX ORDER — OXYCODONE HYDROCHLORIDE 5 MG/1
10 TABLET ORAL EVERY 4 HOURS PRN
Status: DISCONTINUED | OUTPATIENT
Start: 2024-04-23 | End: 2024-04-23 | Stop reason: HOSPADM

## 2024-04-23 RX ORDER — MORPHINE SULFATE 2 MG/ML
4 INJECTION, SOLUTION INTRAMUSCULAR; INTRAVENOUS
Status: DISCONTINUED | OUTPATIENT
Start: 2024-04-23 | End: 2024-04-23 | Stop reason: HOSPADM

## 2024-04-23 RX ORDER — SODIUM CHLORIDE 9 MG/ML
INJECTION, SOLUTION INTRAVENOUS CONTINUOUS PRN
Status: DISCONTINUED | OUTPATIENT
Start: 2024-04-23 | End: 2024-04-23 | Stop reason: SDUPTHER

## 2024-04-23 RX ORDER — FENTANYL CITRATE 50 UG/ML
50 INJECTION, SOLUTION INTRAMUSCULAR; INTRAVENOUS EVERY 5 MIN PRN
Status: DISCONTINUED | OUTPATIENT
Start: 2024-04-23 | End: 2024-04-23 | Stop reason: HOSPADM

## 2024-04-23 RX ORDER — LIDOCAINE HYDROCHLORIDE 20 MG/ML
INJECTION, SOLUTION EPIDURAL; INFILTRATION; INTRACAUDAL; PERINEURAL PRN
Status: DISCONTINUED | OUTPATIENT
Start: 2024-04-23 | End: 2024-04-23 | Stop reason: SDUPTHER

## 2024-04-23 RX ORDER — FENTANYL CITRATE 50 UG/ML
INJECTION, SOLUTION INTRAMUSCULAR; INTRAVENOUS PRN
Status: DISCONTINUED | OUTPATIENT
Start: 2024-04-23 | End: 2024-04-23 | Stop reason: SDUPTHER

## 2024-04-23 RX ORDER — SODIUM CHLORIDE, SODIUM LACTATE, POTASSIUM CHLORIDE, CALCIUM CHLORIDE 600; 310; 30; 20 MG/100ML; MG/100ML; MG/100ML; MG/100ML
INJECTION, SOLUTION INTRAVENOUS SEE ADMIN INSTRUCTIONS
Status: DISCONTINUED | OUTPATIENT
Start: 2024-04-23 | End: 2024-04-23 | Stop reason: HOSPADM

## 2024-04-23 RX ORDER — SODIUM CHLORIDE 9 MG/ML
INJECTION, SOLUTION INTRAVENOUS PRN
Status: DISCONTINUED | OUTPATIENT
Start: 2024-04-23 | End: 2024-04-23 | Stop reason: HOSPADM

## 2024-04-23 RX ORDER — ONDANSETRON 2 MG/ML
8 INJECTION INTRAMUSCULAR; INTRAVENOUS EVERY 8 HOURS PRN
Status: DISCONTINUED | OUTPATIENT
Start: 2024-04-23 | End: 2024-04-23 | Stop reason: HOSPADM

## 2024-04-23 RX ORDER — MORPHINE SULFATE 2 MG/ML
2 INJECTION, SOLUTION INTRAMUSCULAR; INTRAVENOUS
Status: DISCONTINUED | OUTPATIENT
Start: 2024-04-23 | End: 2024-04-23 | Stop reason: HOSPADM

## 2024-04-23 RX ORDER — KETOROLAC TROMETHAMINE 30 MG/ML
INJECTION, SOLUTION INTRAMUSCULAR; INTRAVENOUS
Status: COMPLETED
Start: 2024-04-23 | End: 2024-04-23

## 2024-04-23 RX ORDER — ONDANSETRON 4 MG/1
4 TABLET, ORALLY DISINTEGRATING ORAL EVERY 8 HOURS PRN
Status: DISCONTINUED | OUTPATIENT
Start: 2024-04-23 | End: 2024-04-23 | Stop reason: HOSPADM

## 2024-04-23 RX ORDER — DEXAMETHASONE SODIUM PHOSPHATE 10 MG/ML
INJECTION, EMULSION INTRAMUSCULAR; INTRAVENOUS PRN
Status: DISCONTINUED | OUTPATIENT
Start: 2024-04-23 | End: 2024-04-23 | Stop reason: SDUPTHER

## 2024-04-23 RX ORDER — SODIUM CHLORIDE 0.9 % (FLUSH) 0.9 %
5-40 SYRINGE (ML) INJECTION PRN
Status: DISCONTINUED | OUTPATIENT
Start: 2024-04-23 | End: 2024-04-23 | Stop reason: HOSPADM

## 2024-04-23 RX ORDER — ONDANSETRON 2 MG/ML
INJECTION INTRAMUSCULAR; INTRAVENOUS PRN
Status: DISCONTINUED | OUTPATIENT
Start: 2024-04-23 | End: 2024-04-23 | Stop reason: SDUPTHER

## 2024-04-23 RX ORDER — HYDROCODONE BITARTRATE AND ACETAMINOPHEN 5; 325 MG/1; MG/1
1 TABLET ORAL EVERY 6 HOURS PRN
Qty: 10 TABLET | Refills: 0 | Status: SHIPPED | OUTPATIENT
Start: 2024-04-23 | End: 2024-04-26

## 2024-04-23 RX ORDER — SODIUM CHLORIDE, SODIUM LACTATE, POTASSIUM CHLORIDE, CALCIUM CHLORIDE 600; 310; 30; 20 MG/100ML; MG/100ML; MG/100ML; MG/100ML
INJECTION, SOLUTION INTRAVENOUS CONTINUOUS
Status: DISCONTINUED | OUTPATIENT
Start: 2024-04-23 | End: 2024-04-23 | Stop reason: HOSPADM

## 2024-04-23 RX ORDER — KETOROLAC TROMETHAMINE 30 MG/ML
30 INJECTION, SOLUTION INTRAMUSCULAR; INTRAVENOUS ONCE
Status: COMPLETED | OUTPATIENT
Start: 2024-04-23 | End: 2024-04-23

## 2024-04-23 RX ORDER — SCOLOPAMINE TRANSDERMAL SYSTEM 1 MG/1
1 PATCH, EXTENDED RELEASE TRANSDERMAL ONCE
Status: DISCONTINUED | OUTPATIENT
Start: 2024-04-23 | End: 2024-04-23 | Stop reason: HOSPADM

## 2024-04-23 RX ORDER — ACETAMINOPHEN 500 MG
1000 TABLET ORAL EVERY 8 HOURS PRN
Status: DISCONTINUED | OUTPATIENT
Start: 2024-04-23 | End: 2024-04-23 | Stop reason: HOSPADM

## 2024-04-23 RX ORDER — NALOXONE HYDROCHLORIDE 0.4 MG/ML
INJECTION, SOLUTION INTRAMUSCULAR; INTRAVENOUS; SUBCUTANEOUS PRN
Status: DISCONTINUED | OUTPATIENT
Start: 2024-04-23 | End: 2024-04-23 | Stop reason: HOSPADM

## 2024-04-23 RX ADMIN — KETOROLAC TROMETHAMINE 30 MG: 30 INJECTION INTRAMUSCULAR; INTRAVENOUS at 07:43

## 2024-04-23 RX ADMIN — DEXAMETHASONE SODIUM PHOSPHATE 10 MG: 10 INJECTION, EMULSION INTRAMUSCULAR; INTRAVENOUS at 08:15

## 2024-04-23 RX ADMIN — LIDOCAINE HYDROCHLORIDE 100 MG: 20 INJECTION, SOLUTION EPIDURAL; INFILTRATION; INTRACAUDAL; PERINEURAL at 08:09

## 2024-04-23 RX ADMIN — OXYCODONE 5 MG: 5 TABLET ORAL at 09:25

## 2024-04-23 RX ADMIN — PROPOFOL 150 MG: 10 INJECTION, EMULSION INTRAVENOUS at 08:09

## 2024-04-23 RX ADMIN — SODIUM CHLORIDE: 9 INJECTION, SOLUTION INTRAVENOUS at 08:06

## 2024-04-23 RX ADMIN — ONDANSETRON 4 MG: 2 INJECTION INTRAMUSCULAR; INTRAVENOUS at 08:31

## 2024-04-23 RX ADMIN — FENTANYL CITRATE 100 MCG: 50 INJECTION, SOLUTION INTRAMUSCULAR; INTRAVENOUS at 08:09

## 2024-04-23 RX ADMIN — KETOROLAC TROMETHAMINE 30 MG: 30 INJECTION, SOLUTION INTRAMUSCULAR; INTRAVENOUS at 07:43

## 2024-04-23 ASSESSMENT — PAIN DESCRIPTION - LOCATION: LOCATION: ABDOMEN

## 2024-04-23 ASSESSMENT — PAIN - FUNCTIONAL ASSESSMENT
PAIN_FUNCTIONAL_ASSESSMENT: NONE - DENIES PAIN
PAIN_FUNCTIONAL_ASSESSMENT: 0-10

## 2024-04-23 ASSESSMENT — PAIN DESCRIPTION - DESCRIPTORS: DESCRIPTORS: CRAMPING

## 2024-04-23 ASSESSMENT — PAIN SCALES - GENERAL: PAINLEVEL_OUTOF10: 4

## 2024-04-23 NOTE — H&P
Department of  Obstetrics and Gynecology  History and Physical  Date of Admission:  2024    CHIEF COMPLAINT:   heavy bleeding    History obtained from patient    HISTORY OF PRESENT ILLNESS:     The patient is a 39 y.o. female with significant past medical history of worsening menses who presents for surgical tx.     Past Medical History:        Diagnosis Date    Anxiety     Depression     History of blood transfusion     after c sections x2     &     PONV (postoperative nausea and vomiting)     nausea     Past Surgical History:        Procedure Laterality Date    BLEPHAROPLASTY      bilateral -      SECTION      x2   &     EYE SURGERY      KNEE ARTHROSCOPY      right  2016    SKIN BIOPSY      TUBAL LIGATION          WISDOM TOOTH EXTRACTION         meds:  Current Facility-Administered Medications:     lactated ringers IV soln infusion, , IntraVENous, Continuous, Antonieta Rob MD    sodium chloride flush 0.9 % injection 5-40 mL, 5-40 mL, IntraVENous, 2 times per day, Antonieta Rob MD    sodium chloride flush 0.9 % injection 5-40 mL, 5-40 mL, IntraVENous, PRN, Antonieta Rob MD    0.9 % sodium chloride infusion, , IntraVENous, PRN, Antonieta Rob MD    ondansetron (ZOFRAN) injection 8 mg, 8 mg, IntraVENous, Q8H PRN, Antonieta Rob MD    scopolamine (TRANSDERM-SCOP) transdermal patch 1 patch, 1 patch, TransDERmal, Once, Mitesh Esquivel MD, 1 patch at 24 0722       Allergies:  Patient has no known allergies.     Social History:  TOBACCO:   reports that she has never smoked. She has never been exposed to tobacco smoke. She has never used smokeless tobacco.  ETOH:   reports current alcohol use of about 1.0 standard drink of alcohol per week.  DRUGS:   reports no history of drug use.    Family History:       Problem Relation Age of Onset    No Known Problems Mother     No Known Problems Father     Breast Cancer Maternal Grandmother 50    Heart Disease Maternal Grandfather

## 2024-04-23 NOTE — OP NOTE
Gyn Service    Operative Report        Pt Name: Nataly Razo  [facility]: [id number] [id number]  YOB: 1985  Procedure Performed By: Antonieta Rob MD, MD      Pre-operative Diagnosis:  MENORRHAGIA/MENOMETORRHAGIA    Post-operative Diagnosis:  SAME    Procedure:  HYSTEROSCOPY, D AND C, MAYRA ENDOMETRIAL ABLATION    Surgeon:  TIMOTEO Rob MD     Anesthesia:  General    Estimated blood loss: less than 50 ml    Findings:  Ut sound to 10 cm     Complications:  NONE      Procedure:     The patient was taken to the operating room where she was placed  under general anesthesia in dorsolithotomy position, prepped and draped.  On  exam, the cervix was mobile and in mid position.  The cervix was grasped with  a tenaculum and sounded to 10 cm and was progressively dilated.    A hysteroscope was placed and saline was used as the distension medium.  Abnormalities of the uterus were not noted.  A sharp curettage then took place until a gritty texture was noted throughout the cavity.  The Mayra device  was then placed, the intracervical balloon inflated, and the cavity assessment was passed successfully. The ablation was then completed. Once completed the balloon was deflated and the Mayra removed from the uterine cavity. When it was completed the patient was awakened from general anesthesia and taken to the Recovery Room in good condition. Sponge, lap and needle counts were correct x2.     [unfilled]

## 2024-04-23 NOTE — PROGRESS NOTES
0835: Patient arrived to PACU. Report received from JOIE Rooney and LEIF Bowser. Patient placed on cardiac monitor. Vitals obtained. Pt denies pain and nausea. Cart locked and in lowest position. Peripad in place. No drainage noted. Abdomen soft.   0845: pt denies nausea, given a few ice chips per request.   0849: patient tolerating ice chips.   0900: Pt resting. No needs at this time.   0905: patient meets phase I criteria. Patient transferred to phase II criteria. S/o at beside.   0906: pt given snack and drink. Would like a pain pill before leaving.   0925: Pt tolerating snack and drink. Patient given pain medication per request. Rates pain 4/10, cramping in lower abdomen.   0940: IV removed and patient getting dressed. Discharge instructions given to patient and s/o.  0945: Pt meets discharge criteria. Patient ambulated to car passenger seat in stable condition with this RN by side.

## 2024-04-23 NOTE — ANESTHESIA POSTPROCEDURE EVALUATION
Department of Anesthesiology  Postprocedure Note    Patient: Nataly Razo  MRN: [id number]  YOB: 1985  Date of evaluation: 4/23/2024    Procedure Summary       Date: 04/23/24 Room / Location: [address]    Anesthesia Start: 0800 Anesthesia Stop: 0838    Procedure: Hysteroscopy Dilation Curettage, Endometrial Ablation (Uterus) Diagnosis:       Menorrhagia with regular cycle      (Menorrhagia with regular cycle [N92.0])    Surgeons: Antonieta Rob MD Responsible Provider: Mitesh Esquivel MD    Anesthesia Type: general ASA Status: 2            Anesthesia Type: No value filed.    Jayden Phase I: Jayden Score: 10    Jayden Phase II:      Anesthesia Post Evaluation    Patient location during evaluation: PACU  Patient participation: complete - patient participated  Level of consciousness: awake  Airway patency: patent  Nausea & Vomiting: no vomiting and no nausea  Cardiovascular status: hemodynamically stable  Respiratory status: acceptable  Hydration status: stable  Pain management: adequate    No notable events documented.

## 2024-04-23 NOTE — ANESTHESIA PRE PROCEDURE
Department of Anesthesiology  Preprocedure Note       Name:  Nataly Razo   Age:  39 y.o.  :  1985                                          MRN:  [id number]         Date:  2024      Surgeon: Surgeon(s):  Antonieta Rob MD    Procedure: Procedure(s):  Hysteroscopy Dilation Curettage, Endometrial Ablation    Medications prior to admission:   Prior to Admission medications    Medication Sig Start Date End Date Taking? Authorizing Provider   citalopram (CELEXA) 20 MG tablet Take 1 tablet by mouth every morning 24   Tasneem Dent, APRN - CNP   ibuprofen (ADVIL;MOTRIN) 800 MG tablet Take 1 tablet by mouth every 6 hours as needed for Pain    ProviderDiony MD   acetaminophen (TYLENOL) 500 MG tablet Take 2 tablets by mouth every 6 hours as needed for Pain    Provider, MD Diony       Current medications:    Current Facility-Administered Medications   Medication Dose Route Frequency Provider Last Rate Last Admin   • lactated ringers IV soln infusion   IntraVENous Continuous Antonieta Rob MD       • sodium chloride flush 0.9 % injection 5-40 mL  5-40 mL IntraVENous 2 times per day Antonieta Rob MD       • sodium chloride flush 0.9 % injection 5-40 mL  5-40 mL IntraVENous PRN Antonieta Rob MD       • 0.9 % sodium chloride infusion   IntraVENous PRN Antonieta Rob MD       • ondansetron (ZOFRAN) injection 8 mg  8 mg IntraVENous Q8H PRN Antonieta Rob MD       • scopolamine (TRANSDERM-SCOP) transdermal patch 1 patch  1 patch TransDERmal Once Mitesh Esquivel MD           Allergies:  No Known Allergies    Problem List:    Patient Active Problem List   Diagnosis Code   • Fibrocystic breast disease (FCBD) in female N60.19   • Anxiety and depression F41.9, F32.A   • Axillary mass, right R22.31   • History of loop electrical excision procedure (LEEP) Z98.890   • Insomnia G47.00       Past Medical History:        Diagnosis Date   • Anxiety    • Depression    • History of blood transfusion

## 2024-04-23 NOTE — BRIEF OP NOTE
Brief Operative Report      Pre-operative Diagnosis:  Menorrhagia    Post-operative Diagnosis:  Same    Procedure:  Hysteroscopy, D&C, VERONICA    Surgeon: TIMOTEO Rob MD     Anesthesia:  General endotrachial anesthesia    Estimated blood loss:  Minimal     Findings: See Operative Dictation, Normal uterine cavity sounding length 10cm cervical length 5cm    Complications:  none      See dictated operative report for full details.      Antonieta Rob MD

## 2024-04-23 NOTE — DISCHARGE INSTRUCTIONS
DISCHARGE INSTRUCTIONS  D&C, Hysteroscopy  Dr. Antonieta Rob     GENERAL ANESTHESIA OR SEDATION  Do Not Drive Today  Do Not Consume Alcohol for 48 hours  Resume prescription medications as instructed  You should have someone with you tonight at home.    DIET  Start with liquids - drink extra fluids the next 24-48 hours.  Progress to soft diet as able    ACTIVITY  Rest for the remainder of the day  May begin to drive after 24 hours  May begin to lift over 10 lbs. today  May return to normal activities and work after 24 hours    OPERATIVE SITE CARE  May shower or bathe  No Tampons or intercourse until after 7 days  You should expect vaginal drainage for 7-10 days  Throat lozenges or spray will help sore throat    SPECIAL INSTRUCTIONS / MEDICATIONS:          CALL MY OFFICE FOR:  Heavy bleeding  Pain not relieved by medication  Foul smelling vaginal drainage      IF YOU NEED MEDICAL ATTENTION CALL YOUR PHYSICIAN AT HIS OFFICE, OR LEAVE A MESSAGE WITH THE ANSWERING SERVICE, OR GO TO THE NEAREST EMERGENCY ROOM.    I ACKNOWLEDGE RECEIVING THESE INSTRUCTIONS AND UNDERSTAND THEM           Patient    Significant Other

## 2024-08-01 ENCOUNTER — HOSPITAL ENCOUNTER (OUTPATIENT)
Age: 39
Discharge: HOME | End: 2024-08-01
Payer: COMMERCIAL

## 2024-08-01 ENCOUNTER — OFFICE VISIT (OUTPATIENT)
Dept: FAMILY MEDICINE CLINIC | Age: 39
End: 2024-08-01
Payer: COMMERCIAL

## 2024-08-01 VITALS
HEART RATE: 91 BPM | DIASTOLIC BLOOD PRESSURE: 78 MMHG | SYSTOLIC BLOOD PRESSURE: 102 MMHG | WEIGHT: 159.2 LBS | BODY MASS INDEX: 24.99 KG/M2 | OXYGEN SATURATION: 99 % | TEMPERATURE: 97 F | HEIGHT: 67 IN | RESPIRATION RATE: 20 BRPM

## 2024-08-01 DIAGNOSIS — F41.9 ANXIETY AND DEPRESSION: ICD-10-CM

## 2024-08-01 DIAGNOSIS — Z00.00 ENCOUNTER FOR WELL ADULT EXAM WITHOUT ABNORMAL FINDINGS: ICD-10-CM

## 2024-08-01 DIAGNOSIS — F32.A ANXIETY AND DEPRESSION: ICD-10-CM

## 2024-08-01 DIAGNOSIS — Z00.00 ENCOUNTER FOR WELL ADULT EXAM WITHOUT ABNORMAL FINDINGS: Primary | ICD-10-CM

## 2024-08-01 LAB
25(OH)D3 SERPL-MCNC: 34 NG/ML (ref 30–100)
ALBUMIN SERPL BCG-MCNC: 4.8 G/DL (ref 3.5–5.1)
ALP SERPL-CCNC: 45 U/L (ref 38–126)
ALT SERPL W/O P-5'-P-CCNC: 9 U/L (ref 11–66)
ANION GAP SERPL CALC-SCNC: 15 MEQ/L (ref 8–16)
AST SERPL-CCNC: 15 U/L (ref 5–40)
BASOPHILS ABSOLUTE: 0 THOU/MM3 (ref 0–0.1)
BASOPHILS NFR BLD AUTO: 0.4 %
BILIRUB SERPL-MCNC: 0.6 MG/DL (ref 0.3–1.2)
BUN SERPL-MCNC: 11 MG/DL (ref 7–22)
CALCIUM SERPL-MCNC: 9.4 MG/DL (ref 8.5–10.5)
CHLORIDE SERPL-SCNC: 104 MEQ/L (ref 98–111)
CHOLEST SERPL-MCNC: 186 MG/DL (ref 100–199)
CO2 SERPL-SCNC: 22 MEQ/L (ref 23–33)
CREAT SERPL-MCNC: 0.7 MG/DL (ref 0.4–1.2)
DEPRECATED RDW RBC AUTO: 41.3 FL (ref 35–45)
EOSINOPHIL NFR BLD AUTO: 1.4 %
EOSINOPHILS ABSOLUTE: 0.1 THOU/MM3 (ref 0–0.4)
ERYTHROCYTE [DISTWIDTH] IN BLOOD BY AUTOMATED COUNT: 12.6 % (ref 11.5–14.5)
GFR SERPL CREATININE-BSD FRML MDRD: > 90 ML/MIN/1.73M2
GLUCOSE SERPL-MCNC: 82 MG/DL (ref 70–108)
HCT VFR BLD AUTO: 39.8 % (ref 37–47)
HDLC SERPL-MCNC: 48 MG/DL
HGB BLD-MCNC: 13.3 GM/DL (ref 12–16)
IMM GRANULOCYTES # BLD AUTO: 0.01 THOU/MM3 (ref 0–0.07)
IMM GRANULOCYTES NFR BLD AUTO: 0.2 %
LDLC SERPL CALC-MCNC: 112 MG/DL
LYMPHOCYTES ABSOLUTE: 2 THOU/MM3 (ref 1–4.8)
LYMPHOCYTES NFR BLD AUTO: 36.4 %
MCH RBC QN AUTO: 30.1 PG (ref 26–33)
MCHC RBC AUTO-ENTMCNC: 33.4 GM/DL (ref 32.2–35.5)
MCV RBC AUTO: 90 FL (ref 81–99)
MONOCYTES ABSOLUTE: 0.3 THOU/MM3 (ref 0.4–1.3)
MONOCYTES NFR BLD AUTO: 5.4 %
NEUTROPHILS ABSOLUTE: 3.1 THOU/MM3 (ref 1.8–7.7)
NEUTROPHILS NFR BLD AUTO: 56.2 %
NRBC BLD AUTO-RTO: 0 /100 WBC
PLATELET # BLD AUTO: 182 THOU/MM3 (ref 130–400)
PMV BLD AUTO: 11.9 FL (ref 9.4–12.4)
POTASSIUM SERPL-SCNC: 4 MEQ/L (ref 3.5–5.2)
PROT SERPL-MCNC: 6.9 G/DL (ref 6.1–8)
RBC # BLD AUTO: 4.42 MILL/MM3 (ref 4.2–5.4)
SODIUM SERPL-SCNC: 141 MEQ/L (ref 135–145)
TRIGL SERPL-MCNC: 129 MG/DL (ref 0–199)
TSH SERPL DL<=0.005 MIU/L-ACNC: 1.04 UIU/ML (ref 0.4–4.2)
WBC # BLD AUTO: 5.5 THOU/MM3 (ref 4.8–10.8)

## 2024-08-01 PROCEDURE — 80061 LIPID PANEL: CPT

## 2024-08-01 PROCEDURE — 99395 PREV VISIT EST AGE 18-39: CPT | Performed by: NURSE PRACTITIONER

## 2024-08-01 PROCEDURE — 80053 COMPREHEN METABOLIC PANEL: CPT

## 2024-08-01 PROCEDURE — 82306 VITAMIN D 25 HYDROXY: CPT

## 2024-08-01 PROCEDURE — 84443 ASSAY THYROID STIM HORMONE: CPT

## 2024-08-01 PROCEDURE — 36415 COLL VENOUS BLD VENIPUNCTURE: CPT

## 2024-08-01 PROCEDURE — 85025 COMPLETE CBC W/AUTO DIFF WBC: CPT

## 2024-08-01 ASSESSMENT — ENCOUNTER SYMPTOMS
CHEST TIGHTNESS: 0
EYE PAIN: 0
VOMITING: 0
COUGH: 0
SHORTNESS OF BREATH: 0
SORE THROAT: 0
NAUSEA: 0
ABDOMINAL PAIN: 0

## 2024-08-01 NOTE — PATIENT INSTRUCTIONS
important to you to prevent pregnancy. Talk with your doctor about your choices and what might be best for you.   Prevent problems where you can. Protect your skin from too much sun, wash your hands, brush your teeth twice a day, and wear a seat belt in the car.   Where can you learn more?  Go to https://www.[employer].net/patientEd and enter P072 to learn more about \"Well Visit, Ages 18 to 65: Care Instructions.\"  Current as of: August 6, 2023  Content Version: 14.1  © 2006-2024 [employer].   Care instructions adapted under license by [employer]. If you have questions about a medical condition or this instruction, always ask your healthcare professional. [employer] disclaims any warranty or liability for your use of this information.

## 2024-08-01 NOTE — PROGRESS NOTES
Well Adult Note  Name: Nataly Razo Today’s Date: 2024   MRN: [id number] Sex: Female   Age: 39 y.o. Ethnicity: Non- / Non    : 1985 Race: White (non-)      Nataly Razo is here for a well adult exam.       Subjective   History:  Patient presents for well visit. Diet is healthy. Exercises regularly. Celexa is working well at current dose.  Had ablation for heavy periods but it did not help. Has appointment with gynecologist to discuss hysterectomy.  States has had some increase in anxiety that she feels is perimenopause related but does not want to increase Celexa as it is manageable right now.    Review of Systems   Constitutional:  Negative for chills and fever.   HENT:  Negative for congestion and sore throat.    Eyes:  Negative for pain and visual disturbance.   Respiratory:  Negative for cough, chest tightness and shortness of breath.    Cardiovascular:  Negative for chest pain and palpitations.   Gastrointestinal:  Negative for abdominal pain, nausea and vomiting.   Genitourinary:  Negative for decreased urine volume and dysuria.   Skin:  Negative for rash.   Neurological:  Negative for dizziness, weakness and headaches.   Psychiatric/Behavioral:  Negative for dysphoric mood. The patient is not nervous/anxious.        No Known Allergies  Prior to Visit Medications    Medication Sig Taking? Authorizing Provider   citalopram (CELEXA) 20 MG tablet Take 1 tablet by mouth every morning Yes Tasneem Dent, APRN - CNP   ibuprofen (ADVIL;MOTRIN) 800 MG tablet Take 1 tablet by mouth every 6 hours as needed for Pain Yes Diony Santamaria MD   acetaminophen (TYLENOL) 500 MG tablet Take 2 tablets by mouth every 6 hours as needed for Pain Yes Diony Santamaria MD     Past Medical History:   Diagnosis Date    Anxiety     Depression     History of blood transfusion     after c sections x2    2018 &     PONV (postoperative nausea and vomiting)     nausea     Past Surgical

## 2024-08-26 ENCOUNTER — TRANSCRIBE ORDERS (OUTPATIENT)
Dept: ADMINISTRATIVE | Age: 39
End: 2024-08-26

## 2024-08-26 DIAGNOSIS — Z12.31 ENCOUNTER FOR SCREENING MAMMOGRAM FOR MALIGNANT NEOPLASM OF BREAST: Primary | ICD-10-CM

## 2024-09-05 DIAGNOSIS — F32.A ANXIETY AND DEPRESSION: ICD-10-CM

## 2024-09-05 DIAGNOSIS — F41.9 ANXIETY AND DEPRESSION: ICD-10-CM

## 2024-09-05 RX ORDER — CITALOPRAM HYDROBROMIDE 20 MG/1
20 TABLET ORAL EVERY MORNING
Qty: 90 TABLET | Refills: 3 | Status: SHIPPED | OUTPATIENT
Start: 2024-09-05

## 2024-09-05 NOTE — TELEPHONE ENCOUNTER
Nataly Razo called requesting a refill on the following medications:  Requested Prescriptions     Pending Prescriptions Disp Refills    citalopram (CELEXA) 20 MG tablet 90 tablet 1     Sig: Take 1 tablet by mouth every morning       Date of last visit: 8/1/2024  Date of next visit (if applicable):Visit date not found  Date of last refill: 2/1/2024  Pharmacy Name: [facility]

## 2024-09-11 ENCOUNTER — HOSPITAL ENCOUNTER (EMERGENCY)
Age: 39
Discharge: HOME | End: 2024-09-11
Payer: COMMERCIAL

## 2024-09-11 VITALS
DIASTOLIC BLOOD PRESSURE: 64 MMHG | HEART RATE: 115 BPM | TEMPERATURE: 100.9 F | SYSTOLIC BLOOD PRESSURE: 108 MMHG | OXYGEN SATURATION: 95 % | RESPIRATION RATE: 18 BRPM

## 2024-09-11 DIAGNOSIS — R52 GENERALIZED BODY ACHES: Primary | ICD-10-CM

## 2024-09-11 LAB — SARS-COV-2 RDRP RESP QL NAA+PROBE: NOT  DETECTED

## 2024-09-11 PROCEDURE — 99213 OFFICE O/P EST LOW 20 MIN: CPT

## 2024-09-11 PROCEDURE — 87635 SARS-COV-2 COVID-19 AMP PRB: CPT

## 2024-09-11 ASSESSMENT — PAIN DESCRIPTION - ONSET: ONSET: ON-GOING

## 2024-09-11 ASSESSMENT — PAIN DESCRIPTION - PAIN TYPE: TYPE: ACUTE PAIN

## 2024-09-11 ASSESSMENT — PAIN DESCRIPTION - FREQUENCY: FREQUENCY: CONTINUOUS

## 2024-09-11 ASSESSMENT — ENCOUNTER SYMPTOMS
ABDOMINAL PAIN: 0
SHORTNESS OF BREATH: 0
WHEEZING: 0
COUGH: 0

## 2024-09-11 ASSESSMENT — PAIN - FUNCTIONAL ASSESSMENT: PAIN_FUNCTIONAL_ASSESSMENT: 0-10

## 2024-09-11 ASSESSMENT — PAIN DESCRIPTION - DESCRIPTORS: DESCRIPTORS: SHARP

## 2024-09-11 ASSESSMENT — PAIN DESCRIPTION - LOCATION: LOCATION: HEAD

## 2024-09-11 ASSESSMENT — PAIN SCALES - GENERAL: PAINLEVEL_OUTOF10: 10

## 2024-09-16 ENCOUNTER — HOSPITAL ENCOUNTER (OUTPATIENT)
Age: 39
Discharge: HOME | End: 2024-09-16
Payer: COMMERCIAL

## 2024-09-16 LAB
BASOPHILS ABSOLUTE: 0 THOU/MM3 (ref 0–0.1)
BASOPHILS NFR BLD AUTO: 0.6 %
DEPRECATED RDW RBC AUTO: 40.5 FL (ref 35–45)
EOSINOPHIL NFR BLD AUTO: 6.4 %
EOSINOPHILS ABSOLUTE: 0.3 THOU/MM3 (ref 0–0.4)
ERYTHROCYTE [DISTWIDTH] IN BLOOD BY AUTOMATED COUNT: 12.3 % (ref 11.5–14.5)
HCT VFR BLD AUTO: 35.2 % (ref 37–47)
HGB BLD-MCNC: 11.7 GM/DL (ref 12–16)
IMM GRANULOCYTES # BLD AUTO: 0.01 THOU/MM3 (ref 0–0.07)
IMM GRANULOCYTES NFR BLD AUTO: 0.2 %
LYMPHOCYTES ABSOLUTE: 1.7 THOU/MM3 (ref 1–4.8)
LYMPHOCYTES NFR BLD AUTO: 33 %
MCH RBC QN AUTO: 29.8 PG (ref 26–33)
MCHC RBC AUTO-ENTMCNC: 33.2 GM/DL (ref 32.2–35.5)
MCV RBC AUTO: 89.8 FL (ref 81–99)
MONOCYTES ABSOLUTE: 0.3 THOU/MM3 (ref 0.4–1.3)
MONOCYTES NFR BLD AUTO: 5.8 %
NEUTROPHILS ABSOLUTE: 2.9 THOU/MM3 (ref 1.8–7.7)
NEUTROPHILS NFR BLD AUTO: 54 %
NRBC BLD AUTO-RTO: 0 /100 WBC
PLATELET # BLD AUTO: 214 THOU/MM3 (ref 130–400)
PMV BLD AUTO: 10.8 FL (ref 9.4–12.4)
RBC # BLD AUTO: 3.92 MILL/MM3 (ref 4.2–5.4)
SCAN OF BLOOD SMEAR: NORMAL
WBC # BLD AUTO: 5.3 THOU/MM3 (ref 4.8–10.8)

## 2024-09-16 PROCEDURE — 36415 COLL VENOUS BLD VENIPUNCTURE: CPT

## 2024-09-16 PROCEDURE — 85025 COMPLETE CBC W/AUTO DIFF WBC: CPT

## 2024-09-17 ENCOUNTER — ANESTHESIA EVENT (OUTPATIENT)
Dept: OPERATING ROOM | Age: 39
End: 2024-09-17
Payer: COMMERCIAL

## 2024-09-17 ENCOUNTER — ANESTHESIA (OUTPATIENT)
Dept: OPERATING ROOM | Age: 39
End: 2024-09-17
Payer: COMMERCIAL

## 2024-09-17 ENCOUNTER — HOSPITAL ENCOUNTER (OUTPATIENT)
Age: 39
Setting detail: OUTPATIENT SURGERY
Discharge: HOME | End: 2024-09-17
Attending: OBSTETRICS & GYNECOLOGY | Admitting: OBSTETRICS & GYNECOLOGY
Payer: COMMERCIAL

## 2024-09-17 VITALS
BODY MASS INDEX: 24.55 KG/M2 | DIASTOLIC BLOOD PRESSURE: 80 MMHG | OXYGEN SATURATION: 100 % | HEIGHT: 67 IN | RESPIRATION RATE: 16 BRPM | HEART RATE: 70 BPM | WEIGHT: 156.4 LBS | TEMPERATURE: 98 F | SYSTOLIC BLOOD PRESSURE: 122 MMHG

## 2024-09-17 DIAGNOSIS — N92.1 MENOMETRORRHAGIA: ICD-10-CM

## 2024-09-17 DIAGNOSIS — N92.0 MENORRHAGIA WITH REGULAR CYCLE: Primary | ICD-10-CM

## 2024-09-17 PROCEDURE — 2580000003 HC RX 258: Performed by: NURSE ANESTHETIST, CERTIFIED REGISTERED

## 2024-09-17 PROCEDURE — 7100000011 HC PHASE II RECOVERY - ADDTL 15 MIN: Performed by: OBSTETRICS & GYNECOLOGY

## 2024-09-17 PROCEDURE — 3600000019 HC SURGERY ROBOT ADDTL 15MIN: Performed by: OBSTETRICS & GYNECOLOGY

## 2024-09-17 PROCEDURE — 6360000002 HC RX W HCPCS

## 2024-09-17 PROCEDURE — 3700000000 HC ANESTHESIA ATTENDED CARE: Performed by: OBSTETRICS & GYNECOLOGY

## 2024-09-17 PROCEDURE — 2500000003 HC RX 250 WO HCPCS: Performed by: NURSE ANESTHETIST, CERTIFIED REGISTERED

## 2024-09-17 PROCEDURE — 88307 TISSUE EXAM BY PATHOLOGIST: CPT

## 2024-09-17 PROCEDURE — S2900 ROBOTIC SURGICAL SYSTEM: HCPCS | Performed by: OBSTETRICS & GYNECOLOGY

## 2024-09-17 PROCEDURE — 2709999900 HC NON-CHARGEABLE SUPPLY: Performed by: OBSTETRICS & GYNECOLOGY

## 2024-09-17 PROCEDURE — 3600000009 HC SURGERY ROBOT BASE: Performed by: OBSTETRICS & GYNECOLOGY

## 2024-09-17 PROCEDURE — 2580000003 HC RX 258: Performed by: OBSTETRICS & GYNECOLOGY

## 2024-09-17 PROCEDURE — 7100000001 HC PACU RECOVERY - ADDTL 15 MIN: Performed by: OBSTETRICS & GYNECOLOGY

## 2024-09-17 PROCEDURE — 7100000010 HC PHASE II RECOVERY - FIRST 15 MIN: Performed by: OBSTETRICS & GYNECOLOGY

## 2024-09-17 PROCEDURE — 7100000000 HC PACU RECOVERY - FIRST 15 MIN: Performed by: OBSTETRICS & GYNECOLOGY

## 2024-09-17 PROCEDURE — 6370000000 HC RX 637 (ALT 250 FOR IP): Performed by: OBSTETRICS & GYNECOLOGY

## 2024-09-17 PROCEDURE — 6360000002 HC RX W HCPCS: Performed by: OBSTETRICS & GYNECOLOGY

## 2024-09-17 PROCEDURE — 3700000001 HC ADD 15 MINUTES (ANESTHESIA): Performed by: OBSTETRICS & GYNECOLOGY

## 2024-09-17 PROCEDURE — 6360000002 HC RX W HCPCS: Performed by: NURSE ANESTHETIST, CERTIFIED REGISTERED

## 2024-09-17 PROCEDURE — 6370000000 HC RX 637 (ALT 250 FOR IP): Performed by: ANESTHESIOLOGY

## 2024-09-17 RX ORDER — OXYCODONE HYDROCHLORIDE 5 MG/1
10 TABLET ORAL EVERY 4 HOURS PRN
Status: DISCONTINUED | OUTPATIENT
Start: 2024-09-17 | End: 2024-09-17 | Stop reason: HOSPADM

## 2024-09-17 RX ORDER — SODIUM CHLORIDE 9 MG/ML
INJECTION, SOLUTION INTRAVENOUS PRN
Status: CANCELLED | OUTPATIENT
Start: 2024-09-17

## 2024-09-17 RX ORDER — SODIUM CHLORIDE, SODIUM LACTATE, POTASSIUM CHLORIDE, CALCIUM CHLORIDE 600; 310; 30; 20 MG/100ML; MG/100ML; MG/100ML; MG/100ML
INJECTION, SOLUTION INTRAVENOUS
Status: DISCONTINUED | OUTPATIENT
Start: 2024-09-17 | End: 2024-09-17 | Stop reason: SDUPTHER

## 2024-09-17 RX ORDER — OXYCODONE HYDROCHLORIDE 5 MG/1
5 TABLET ORAL EVERY 6 HOURS PRN
Qty: 28 TABLET | Refills: 0 | Status: SHIPPED | OUTPATIENT
Start: 2024-09-17 | End: 2024-09-24

## 2024-09-17 RX ORDER — FENTANYL CITRATE 50 UG/ML
INJECTION, SOLUTION INTRAMUSCULAR; INTRAVENOUS
Status: DISCONTINUED | OUTPATIENT
Start: 2024-09-17 | End: 2024-09-17 | Stop reason: SDUPTHER

## 2024-09-17 RX ORDER — IBUPROFEN 800 MG/1
800 TABLET, FILM COATED ORAL EVERY 8 HOURS
Status: DISCONTINUED | OUTPATIENT
Start: 2024-09-18 | End: 2024-09-17 | Stop reason: HOSPADM

## 2024-09-17 RX ORDER — ONDANSETRON 4 MG/1
4 TABLET, ORALLY DISINTEGRATING ORAL 3 TIMES DAILY PRN
Qty: 21 TABLET | Refills: 0 | Status: SHIPPED | OUTPATIENT
Start: 2024-09-17

## 2024-09-17 RX ORDER — SODIUM CHLORIDE, SODIUM LACTATE, POTASSIUM CHLORIDE, CALCIUM CHLORIDE 600; 310; 30; 20 MG/100ML; MG/100ML; MG/100ML; MG/100ML
INJECTION, SOLUTION INTRAVENOUS CONTINUOUS
Status: CANCELLED | OUTPATIENT
Start: 2024-09-17

## 2024-09-17 RX ORDER — ONDANSETRON 2 MG/ML
INJECTION INTRAMUSCULAR; INTRAVENOUS
Status: DISCONTINUED | OUTPATIENT
Start: 2024-09-17 | End: 2024-09-17 | Stop reason: SDUPTHER

## 2024-09-17 RX ORDER — SODIUM CHLORIDE 9 MG/ML
INJECTION, SOLUTION INTRAVENOUS PRN
Status: DISCONTINUED | OUTPATIENT
Start: 2024-09-17 | End: 2024-09-17 | Stop reason: HOSPADM

## 2024-09-17 RX ORDER — BUPIVACAINE HYDROCHLORIDE 5 MG/ML
INJECTION, SOLUTION EPIDURAL; INTRACAUDAL PRN
Status: DISCONTINUED | OUTPATIENT
Start: 2024-09-17 | End: 2024-09-17 | Stop reason: ALTCHOICE

## 2024-09-17 RX ORDER — FENTANYL CITRATE 50 UG/ML
50 INJECTION, SOLUTION INTRAMUSCULAR; INTRAVENOUS EVERY 5 MIN PRN
Status: DISCONTINUED | OUTPATIENT
Start: 2024-09-17 | End: 2024-09-17 | Stop reason: HOSPADM

## 2024-09-17 RX ORDER — ONDANSETRON 2 MG/ML
4 INJECTION INTRAMUSCULAR; INTRAVENOUS
Status: DISCONTINUED | OUTPATIENT
Start: 2024-09-17 | End: 2024-09-17 | Stop reason: HOSPADM

## 2024-09-17 RX ORDER — MEPERIDINE HYDROCHLORIDE 25 MG/ML
12.5 INJECTION INTRAMUSCULAR; INTRAVENOUS; SUBCUTANEOUS EVERY 5 MIN PRN
Status: DISCONTINUED | OUTPATIENT
Start: 2024-09-17 | End: 2024-09-17 | Stop reason: HOSPADM

## 2024-09-17 RX ORDER — KETOROLAC TROMETHAMINE 30 MG/ML
30 INJECTION, SOLUTION INTRAMUSCULAR; INTRAVENOUS EVERY 6 HOURS
Status: DISCONTINUED | OUTPATIENT
Start: 2024-09-17 | End: 2024-09-17 | Stop reason: HOSPADM

## 2024-09-17 RX ORDER — MIDAZOLAM HYDROCHLORIDE 1 MG/ML
INJECTION INTRAMUSCULAR; INTRAVENOUS
Status: DISCONTINUED | OUTPATIENT
Start: 2024-09-17 | End: 2024-09-17 | Stop reason: SDUPTHER

## 2024-09-17 RX ORDER — METRONIDAZOLE 500 MG/100ML
500 INJECTION, SOLUTION INTRAVENOUS ONCE
Status: COMPLETED | OUTPATIENT
Start: 2024-09-17 | End: 2024-09-17

## 2024-09-17 RX ORDER — HYDROMORPHONE HYDROCHLORIDE 2 MG/ML
INJECTION, SOLUTION INTRAMUSCULAR; INTRAVENOUS; SUBCUTANEOUS
Status: DISCONTINUED | OUTPATIENT
Start: 2024-09-17 | End: 2024-09-17 | Stop reason: SDUPTHER

## 2024-09-17 RX ORDER — SODIUM CHLORIDE 0.9 % (FLUSH) 0.9 %
5-40 SYRINGE (ML) INJECTION EVERY 12 HOURS SCHEDULED
Status: CANCELLED | OUTPATIENT
Start: 2024-09-17

## 2024-09-17 RX ORDER — ONDANSETRON 4 MG/1
4 TABLET, ORALLY DISINTEGRATING ORAL EVERY 8 HOURS PRN
Status: CANCELLED | OUTPATIENT
Start: 2024-09-17

## 2024-09-17 RX ORDER — SODIUM CHLORIDE 0.9 % (FLUSH) 0.9 %
5-40 SYRINGE (ML) INJECTION EVERY 12 HOURS SCHEDULED
Status: DISCONTINUED | OUTPATIENT
Start: 2024-09-17 | End: 2024-09-17 | Stop reason: HOSPADM

## 2024-09-17 RX ORDER — PROPOFOL 10 MG/ML
INJECTION, EMULSION INTRAVENOUS
Status: DISCONTINUED | OUTPATIENT
Start: 2024-09-17 | End: 2024-09-17 | Stop reason: SDUPTHER

## 2024-09-17 RX ORDER — KETOROLAC TROMETHAMINE 30 MG/ML
INJECTION, SOLUTION INTRAMUSCULAR; INTRAVENOUS
Status: COMPLETED
Start: 2024-09-17 | End: 2024-09-17

## 2024-09-17 RX ORDER — SCOLOPAMINE TRANSDERMAL SYSTEM 1 MG/1
1 PATCH, EXTENDED RELEASE TRANSDERMAL
Status: DISCONTINUED | OUTPATIENT
Start: 2024-09-17 | End: 2024-09-17 | Stop reason: HOSPADM

## 2024-09-17 RX ORDER — OXYCODONE HYDROCHLORIDE 5 MG/1
5 TABLET ORAL EVERY 4 HOURS PRN
Status: DISCONTINUED | OUTPATIENT
Start: 2024-09-17 | End: 2024-09-17 | Stop reason: HOSPADM

## 2024-09-17 RX ORDER — ONDANSETRON 2 MG/ML
4 INJECTION INTRAMUSCULAR; INTRAVENOUS EVERY 6 HOURS PRN
Status: CANCELLED | OUTPATIENT
Start: 2024-09-17

## 2024-09-17 RX ORDER — SODIUM CHLORIDE 0.9 % (FLUSH) 0.9 %
5-40 SYRINGE (ML) INJECTION PRN
Status: DISCONTINUED | OUTPATIENT
Start: 2024-09-17 | End: 2024-09-17 | Stop reason: HOSPADM

## 2024-09-17 RX ORDER — ROCURONIUM BROMIDE 10 MG/ML
INJECTION, SOLUTION INTRAVENOUS
Status: DISCONTINUED | OUTPATIENT
Start: 2024-09-17 | End: 2024-09-17 | Stop reason: SDUPTHER

## 2024-09-17 RX ORDER — SODIUM CHLORIDE, SODIUM LACTATE, POTASSIUM CHLORIDE, CALCIUM CHLORIDE 600; 310; 30; 20 MG/100ML; MG/100ML; MG/100ML; MG/100ML
INJECTION, SOLUTION INTRAVENOUS CONTINUOUS
Status: DISCONTINUED | OUTPATIENT
Start: 2024-09-17 | End: 2024-09-17 | Stop reason: HOSPADM

## 2024-09-17 RX ORDER — NALOXONE HYDROCHLORIDE 0.4 MG/ML
INJECTION, SOLUTION INTRAMUSCULAR; INTRAVENOUS; SUBCUTANEOUS PRN
Status: DISCONTINUED | OUTPATIENT
Start: 2024-09-17 | End: 2024-09-17 | Stop reason: HOSPADM

## 2024-09-17 RX ORDER — DEXAMETHASONE SODIUM PHOSPHATE 10 MG/ML
INJECTION, EMULSION INTRAMUSCULAR; INTRAVENOUS
Status: DISCONTINUED | OUTPATIENT
Start: 2024-09-17 | End: 2024-09-17 | Stop reason: SDUPTHER

## 2024-09-17 RX ORDER — SODIUM CHLORIDE 0.9 % (FLUSH) 0.9 %
5-40 SYRINGE (ML) INJECTION PRN
Status: CANCELLED | OUTPATIENT
Start: 2024-09-17

## 2024-09-17 RX ORDER — LIDOCAINE HYDROCHLORIDE 20 MG/ML
INJECTION, SOLUTION INTRAVENOUS
Status: DISCONTINUED | OUTPATIENT
Start: 2024-09-17 | End: 2024-09-17 | Stop reason: SDUPTHER

## 2024-09-17 RX ADMIN — KETOROLAC TROMETHAMINE 30 MG: 30 INJECTION, SOLUTION INTRAMUSCULAR; INTRAVENOUS at 11:56

## 2024-09-17 RX ADMIN — LIDOCAINE HYDROCHLORIDE 80 MG: 20 INJECTION, SOLUTION INTRAVENOUS at 09:40

## 2024-09-17 RX ADMIN — HYDROMORPHONE HYDROCHLORIDE 0.5 MG: 2 INJECTION INTRAMUSCULAR; INTRAVENOUS; SUBCUTANEOUS at 09:55

## 2024-09-17 RX ADMIN — ONDANSETRON 4 MG: 2 INJECTION INTRAMUSCULAR; INTRAVENOUS at 10:57

## 2024-09-17 RX ADMIN — FENTANYL CITRATE 50 MCG: 50 INJECTION, SOLUTION INTRAMUSCULAR; INTRAVENOUS at 10:10

## 2024-09-17 RX ADMIN — KETOROLAC TROMETHAMINE 30 MG: 30 INJECTION, SOLUTION INTRAMUSCULAR at 11:56

## 2024-09-17 RX ADMIN — PROPOFOL 150 MCG/KG/MIN: 10 INJECTION, EMULSION INTRAVENOUS at 09:44

## 2024-09-17 RX ADMIN — PROPOFOL 30 MG: 10 INJECTION, EMULSION INTRAVENOUS at 11:03

## 2024-09-17 RX ADMIN — ROCURONIUM BROMIDE 10 MG: 10 INJECTION INTRAVENOUS at 10:32

## 2024-09-17 RX ADMIN — PROPOFOL 30 MG: 10 INJECTION, EMULSION INTRAVENOUS at 10:58

## 2024-09-17 RX ADMIN — SUGAMMADEX 200 MG: 100 INJECTION, SOLUTION INTRAVENOUS at 10:57

## 2024-09-17 RX ADMIN — MIDAZOLAM 2 MG: 1 INJECTION INTRAMUSCULAR; INTRAVENOUS at 09:36

## 2024-09-17 RX ADMIN — METRONIDAZOLE 500 MG: 500 SOLUTION INTRAVENOUS at 09:44

## 2024-09-17 RX ADMIN — HYDROMORPHONE HYDROCHLORIDE 1 MG: 2 INJECTION INTRAMUSCULAR; INTRAVENOUS; SUBCUTANEOUS at 11:14

## 2024-09-17 RX ADMIN — SODIUM CHLORIDE, POTASSIUM CHLORIDE, SODIUM LACTATE AND CALCIUM CHLORIDE: 600; 310; 30; 20 INJECTION, SOLUTION INTRAVENOUS at 10:35

## 2024-09-17 RX ADMIN — FENTANYL CITRATE 100 MCG: 50 INJECTION, SOLUTION INTRAMUSCULAR; INTRAVENOUS at 09:40

## 2024-09-17 RX ADMIN — ROCURONIUM BROMIDE 50 MG: 10 INJECTION INTRAVENOUS at 09:40

## 2024-09-17 RX ADMIN — SODIUM CHLORIDE, POTASSIUM CHLORIDE, SODIUM LACTATE AND CALCIUM CHLORIDE: 600; 310; 30; 20 INJECTION, SOLUTION INTRAVENOUS at 09:21

## 2024-09-17 RX ADMIN — FENTANYL CITRATE 50 MCG: 50 INJECTION, SOLUTION INTRAMUSCULAR; INTRAVENOUS at 11:03

## 2024-09-17 RX ADMIN — HYDROMORPHONE HYDROCHLORIDE 0.5 MG: 2 INJECTION INTRAMUSCULAR; INTRAVENOUS; SUBCUTANEOUS at 11:03

## 2024-09-17 RX ADMIN — OXYCODONE HYDROCHLORIDE 10 MG: 5 TABLET ORAL at 12:20

## 2024-09-17 RX ADMIN — WATER 2000 MG: 1 INJECTION INTRAMUSCULAR; INTRAVENOUS; SUBCUTANEOUS at 09:53

## 2024-09-17 RX ADMIN — PROPOFOL 170 MG: 10 INJECTION, EMULSION INTRAVENOUS at 09:40

## 2024-09-17 RX ADMIN — DEXAMETHASONE SODIUM PHOSPHATE 10 MG: 10 INJECTION, EMULSION INTRAMUSCULAR; INTRAVENOUS at 09:44

## 2024-09-17 ASSESSMENT — PAIN DESCRIPTION - DESCRIPTORS: DESCRIPTORS: ACHING;CRAMPING

## 2024-09-17 ASSESSMENT — PAIN DESCRIPTION - LOCATION: LOCATION: ABDOMEN

## 2024-09-17 ASSESSMENT — PAIN SCALES - GENERAL
PAINLEVEL_OUTOF10: 0
PAINLEVEL_OUTOF10: 5

## 2024-11-22 ENCOUNTER — HOSPITAL ENCOUNTER (OUTPATIENT)
Dept: MAMMOGRAPHY | Age: 39
Discharge: HOME | End: 2024-11-22
Attending: OBSTETRICS & GYNECOLOGY
Payer: COMMERCIAL

## 2024-11-22 VITALS — HEIGHT: 67 IN | WEIGHT: 156 LBS | BODY MASS INDEX: 24.48 KG/M2

## 2024-11-22 DIAGNOSIS — Z12.31 ENCOUNTER FOR SCREENING MAMMOGRAM FOR MALIGNANT NEOPLASM OF BREAST: ICD-10-CM

## 2024-11-22 PROCEDURE — 77063 BREAST TOMOSYNTHESIS BI: CPT

## 2025-02-13 ENCOUNTER — OFFICE VISIT (OUTPATIENT)
Dept: FAMILY MEDICINE CLINIC | Age: 40
End: 2025-02-13
Payer: COMMERCIAL

## 2025-02-13 VITALS
SYSTOLIC BLOOD PRESSURE: 100 MMHG | HEART RATE: 79 BPM | WEIGHT: 160 LBS | DIASTOLIC BLOOD PRESSURE: 72 MMHG | BODY MASS INDEX: 25.05 KG/M2 | RESPIRATION RATE: 16 BRPM | OXYGEN SATURATION: 98 %

## 2025-02-13 DIAGNOSIS — M54.50 ACUTE MIDLINE LOW BACK PAIN WITHOUT SCIATICA: Primary | ICD-10-CM

## 2025-02-13 PROCEDURE — 99213 OFFICE O/P EST LOW 20 MIN: CPT | Performed by: NURSE PRACTITIONER

## 2025-02-13 RX ORDER — PREDNISONE 50 MG/1
50 TABLET ORAL DAILY
Qty: 5 TABLET | Refills: 0 | Status: SHIPPED | OUTPATIENT
Start: 2025-02-13 | End: 2025-02-18

## 2025-02-13 SDOH — ECONOMIC STABILITY: FOOD INSECURITY: WITHIN THE PAST 12 MONTHS, THE FOOD YOU BOUGHT JUST DIDN'T LAST AND YOU DIDN'T HAVE MONEY TO GET MORE.: NEVER TRUE

## 2025-02-13 SDOH — ECONOMIC STABILITY: FOOD INSECURITY: WITHIN THE PAST 12 MONTHS, YOU WORRIED THAT YOUR FOOD WOULD RUN OUT BEFORE YOU GOT MONEY TO BUY MORE.: NEVER TRUE

## 2025-02-13 ASSESSMENT — PATIENT HEALTH QUESTIONNAIRE - PHQ9
1. LITTLE INTEREST OR PLEASURE IN DOING THINGS: NOT AT ALL
2. FEELING DOWN, DEPRESSED OR HOPELESS: NOT AT ALL
6. FEELING BAD ABOUT YOURSELF - OR THAT YOU ARE A FAILURE OR HAVE LET YOURSELF OR YOUR FAMILY DOWN: NOT AT ALL
7. TROUBLE CONCENTRATING ON THINGS, SUCH AS READING THE NEWSPAPER OR WATCHING TELEVISION: NOT AT ALL
4. FEELING TIRED OR HAVING LITTLE ENERGY: NOT AT ALL
5. POOR APPETITE OR OVEREATING: NOT AT ALL
SUM OF ALL RESPONSES TO PHQ QUESTIONS 1-9: 0
8. MOVING OR SPEAKING SO SLOWLY THAT OTHER PEOPLE COULD HAVE NOTICED. OR THE OPPOSITE, BEING SO FIGETY OR RESTLESS THAT YOU HAVE BEEN MOVING AROUND A LOT MORE THAN USUAL: NOT AT ALL
SUM OF ALL RESPONSES TO PHQ QUESTIONS 1-9: 0
10. IF YOU CHECKED OFF ANY PROBLEMS, HOW DIFFICULT HAVE THESE PROBLEMS MADE IT FOR YOU TO DO YOUR WORK, TAKE CARE OF THINGS AT HOME, OR GET ALONG WITH OTHER PEOPLE: NOT DIFFICULT AT ALL
SUM OF ALL RESPONSES TO PHQ QUESTIONS 1-9: 0
SUM OF ALL RESPONSES TO PHQ9 QUESTIONS 1 & 2: 0
3. TROUBLE FALLING OR STAYING ASLEEP: NOT AT ALL
SUM OF ALL RESPONSES TO PHQ QUESTIONS 1-9: 0
9. THOUGHTS THAT YOU WOULD BE BETTER OFF DEAD, OR OF HURTING YOURSELF: NOT AT ALL

## 2025-02-13 NOTE — PROGRESS NOTES
[facility] PROFESSIONAL [facility]  [address]  [facility] [address]  Dept: [phone]  Dept Fax: [phone]  Loc: [phone]     Visit Date:  2/13/2025    Provider: ELYSSA Marte CNP        Patient:  Nataly Razo  YOB: 1985    HPI:     Chief Complaint   Patient presents with    Back Pain     X 1 month- no injury that stands out       History of Present Illness  The patient is a 39-year-old female who presents for evaluation of back pain.    She has been experiencing persistent, severe back pain for over a month, localized to the lower middle region of her back. The onset of the pain was not associated with any specific incident or injury. The pain, described as shooting in nature, is constant and does not radiate to other areas. She rates the intensity of the pain as an 8 on a scale of 0 to 10. The pain is exacerbated by certain activities, particularly bending forward from a standing position, which she finds extremely difficult due to the pain. She reports no history of any specific movements such as bending, twisting, or lifting that could have potentially triggered the pain. She also reports no radiation of the pain into the sciatic region or buttocks. 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 States at this time pain is 2/10.    MEDICATIONS  Motrin       Medications    Current Outpatient Medications:     predniSONE (DELTASONE) 50 MG tablet, Take 1 tablet by mouth daily for 5 days, Disp: 5 tablet, Rfl: 0    citalopram (CELEXA) 20 MG tablet, Take 1 tablet by mouth every morning, Disp: 90 tablet, Rfl: 3    ibuprofen (ADVIL;MOTRIN) 800 MG tablet, Take 1 tablet by mouth every 6 hours as needed for Pain, Disp: , Rfl:     acetaminophen (TYLENOL) 500 MG tablet, Take 2 tablets by mouth every 6 hours as needed for Pain, Disp: , Rfl:

## 2025-03-11 ENCOUNTER — HOSPITAL ENCOUNTER (OUTPATIENT)
Dept: PHYSICAL THERAPY | Age: 40
Setting detail: THERAPIES SERIES
Discharge: HOME | End: 2025-03-11
Payer: COMMERCIAL

## 2025-03-11 PROCEDURE — 97161 PT EVAL LOW COMPLEX 20 MIN: CPT

## 2025-03-11 PROCEDURE — 97110 THERAPEUTIC EXERCISES: CPT

## 2025-03-11 NOTE — PROGRESS NOTES
[facility]  PHYSICAL THERAPY  [x] EVALUATION  [] DAILY NOTE (LAND) [] DAILY NOTE (AQUATIC ) [] PROGRESS NOTE [] DISCHARGE NOTE    [] [facility] [place]   [x] [facility]    [] [facility]   [] [facility]    Date: 3/11/2025  Patient Name:  Nataly Razo  : 1985  MRN: [id number]  CSN: [id number]    Referring Practitioner Tasneem Dent, APRN - CNP [id number]      Diagnosis  Diagnoses       M54.50 (ICD-10-CM) - Low back pain, unspecified           Treatment Diagnosis M54.59  Other Low Back Pain  M54.6  Thoracic Pain   Date of Evaluation 3/11/25   Additional Pertinent History Nataly Razo has a past medical history of Anxiety, Depression, History of blood transfusion, and PONV (postoperative nausea and vomiting).  she has a past surgical history that includes  section; [place] tooth extraction; eye surgery; Knee arthroscopy R ; skin biopsy; blepharoplasty; Tubal ligation; Dilation and curettage of uterus (N/A, 2024); and Hysterectomy (N/A, 2024).     Allergies No Known Allergies   Medications   Current Outpatient Medications:     citalopram (CELEXA) 20 MG tablet, Take 1 tablet by mouth every morning, Disp: 90 tablet, Rfl: 3    ibuprofen (ADVIL;MOTRIN) 800 MG tablet, Take 1 tablet by mouth every 6 hours as needed for Pain, Disp: , Rfl:     acetaminophen (TYLENOL) 500 MG tablet, Take 2 tablets by mouth every 6 hours as needed for Pain, Disp: , Rfl:       Functional Outcome Measure Used Oswestry back disability    Functional Outcome Score Eval 18 (3/11/25)       Insurance: Primary: Payor: [employer] /  /  / ,   Secondary:    Authorization Information  DEDUCTIBLE: $4000 MET $ 0.92     OUT OF POCKET: $8000 MET $1.91              INSURANCE PAYS AT:  80% after deductible              PATIENT RESPONSIBILITY AND/OR CO-PAY:  20% after deductible  SECONDARY INSURANCE COMPANY:  NA      PRE CERTIFICATION REQUIRED:

## 2025-03-12 ENCOUNTER — HOSPITAL ENCOUNTER (OUTPATIENT)
Dept: PHYSICAL THERAPY | Age: 40
Setting detail: THERAPIES SERIES
Discharge: HOME | End: 2025-03-12
Payer: COMMERCIAL

## 2025-03-12 PROCEDURE — 97110 THERAPEUTIC EXERCISES: CPT

## 2025-03-12 PROCEDURE — 97035 APP MDLTY 1+ULTRASOUND EA 15: CPT

## 2025-03-12 NOTE — PROGRESS NOTES
[facility]  PHYSICAL THERAPY  [] EVALUATION  [x] DAILY NOTE (LAND) [] DAILY NOTE (AQUATIC ) [] PROGRESS NOTE [] DISCHARGE NOTE    [] [facility] [place]   [x] [place] [facility]    [] [facility]   [] [facility]    Date: 3/12/2025  Patient Name:  Nataly Razo  : 1985  MRN: [id number]  CSN: [id number]    Referring Practitioner Tasneem Dent, APRN - CNP [id number]      Diagnosis  Diagnoses       M54.50 (ICD-10-CM) - Low back pain, unspecified           Treatment Diagnosis M54.59  Other Low Back Pain  M54.6  Thoracic Pain   Date of Evaluation 3/11/25   Additional Pertinent History Nataly Razo has a past medical history of Anxiety, Depression, History of blood transfusion, and PONV (postoperative nausea and vomiting).  she has a past surgical history that includes  section; [place] tooth extraction; eye surgery; Knee arthroscopy R ; skin biopsy; blepharoplasty; Tubal ligation; Dilation and curettage of uterus (N/A, 2024); and Hysterectomy (N/A, 2024).     Allergies No Known Allergies   Medications   Current Outpatient Medications:     citalopram (CELEXA) 20 MG tablet, Take 1 tablet by mouth every morning, Disp: 90 tablet, Rfl: 3    ibuprofen (ADVIL;MOTRIN) 800 MG tablet, Take 1 tablet by mouth every 6 hours as needed for Pain, Disp: , Rfl:     acetaminophen (TYLENOL) 500 MG tablet, Take 2 tablets by mouth every 6 hours as needed for Pain, Disp: , Rfl:       Functional Outcome Measure Used Oswestry back disability    Functional Outcome Score Eval 18 (3/11/25)       Insurance: Primary: Payor: [employer] /  /  / ,   Secondary:    Authorization Information  DEDUCTIBLE: $4000 MET $ 0.92     OUT OF POCKET: $8000 MET $1.91              INSURANCE PAYS AT:  80% after deductible              PATIENT RESPONSIBILITY AND/OR CO-PAY:  20% after deductible  SECONDARY INSURANCE COMPANY:  NA      PRE CERTIFICATION REQUIRED:

## 2025-03-20 NOTE — DISCHARGE SUMMARY
[facility]  PHYSICAL THERAPY OUTPATIENT QUICK DISCHARGE NOTE  [facility]    Date: 3/20/2025  Patient Name:  Nataly Razo  : 1985  MRN: [id number]  CSN: [id number]    Referring Practitioner Tasneem Dent, APRN - CNP [id number]      Diagnosis Low back pain, unspecified     Patient is discharged from Physical Therapy services at this time.  See last note for details related to results of therapy and goal achievement.    Reason for discharge: Attendance. No show 2 consecutive appointments.  Will discharge per attendance policy given at time of eval.  VM left for patient regarding discharge    Kayla Mejia, PT

## 2025-03-24 ENCOUNTER — HOSPITAL ENCOUNTER (OUTPATIENT)
Dept: PHYSICAL THERAPY | Age: 40
Setting detail: THERAPIES SERIES
Discharge: HOME | End: 2025-03-24
Payer: COMMERCIAL

## 2025-03-26 ENCOUNTER — APPOINTMENT (OUTPATIENT)
Dept: PHYSICAL THERAPY | Age: 40
End: 2025-03-26
Payer: COMMERCIAL

## 2025-04-10 ENCOUNTER — OFFICE VISIT (OUTPATIENT)
Dept: FAMILY MEDICINE CLINIC | Age: 40
End: 2025-04-10
Payer: COMMERCIAL

## 2025-04-10 ENCOUNTER — HOSPITAL ENCOUNTER (OUTPATIENT)
Dept: GENERAL RADIOLOGY | Age: 40
Discharge: HOME | End: 2025-04-10
Payer: COMMERCIAL

## 2025-04-10 VITALS
SYSTOLIC BLOOD PRESSURE: 122 MMHG | WEIGHT: 155 LBS | OXYGEN SATURATION: 98 % | RESPIRATION RATE: 18 BRPM | HEIGHT: 67 IN | HEART RATE: 101 BPM | DIASTOLIC BLOOD PRESSURE: 82 MMHG | BODY MASS INDEX: 24.33 KG/M2

## 2025-04-10 DIAGNOSIS — F41.9 ANXIETY AND DEPRESSION: Primary | ICD-10-CM

## 2025-04-10 DIAGNOSIS — Z79.899 LONG-TERM USE OF HIGH-RISK MEDICATION: ICD-10-CM

## 2025-04-10 DIAGNOSIS — F32.A ANXIETY AND DEPRESSION: Primary | ICD-10-CM

## 2025-04-10 DIAGNOSIS — R53.83 FATIGUE, UNSPECIFIED TYPE: ICD-10-CM

## 2025-04-10 DIAGNOSIS — R82.90 CLOUDY URINE: ICD-10-CM

## 2025-04-10 DIAGNOSIS — F32.A ANXIETY AND DEPRESSION: ICD-10-CM

## 2025-04-10 DIAGNOSIS — R68.82 DECREASED LIBIDO: ICD-10-CM

## 2025-04-10 DIAGNOSIS — F41.9 ANXIETY AND DEPRESSION: ICD-10-CM

## 2025-04-10 LAB
25(OH)D3 SERPL-MCNC: 41 NG/ML (ref 30–100)
ALBUMIN SERPL BCG-MCNC: 4.4 G/DL (ref 3.4–4.9)
ALP SERPL-CCNC: 44 U/L (ref 38–126)
ALT SERPL W/O P-5'-P-CCNC: 18 U/L (ref 10–35)
ANION GAP SERPL CALC-SCNC: 10 MEQ/L (ref 8–16)
AST SERPL-CCNC: 21 U/L (ref 10–35)
BASOPHILS ABSOLUTE: 0 THOU/MM3 (ref 0–0.1)
BASOPHILS NFR BLD AUTO: 0.3 %
BILIRUB SERPL-MCNC: 0.4 MG/DL (ref 0.3–1.2)
BILIRUBIN, POC: NEGATIVE
BLOOD URINE, POC: NEGATIVE
BUN SERPL-MCNC: 10 MG/DL (ref 8–23)
CALCIUM SERPL-MCNC: 9.5 MG/DL (ref 8.6–10)
CHLORIDE SERPL-SCNC: 105 MEQ/L (ref 98–111)
CLARITY, POC: CLEAR
CO2 SERPL-SCNC: 25 MEQ/L (ref 22–29)
COLOR, POC: YELLOW
CREAT SERPL-MCNC: 0.6 MG/DL (ref 0.5–0.9)
DEPRECATED RDW RBC AUTO: 40 FL (ref 35–45)
EOSINOPHIL NFR BLD AUTO: 3.3 %
EOSINOPHILS ABSOLUTE: 0.2 THOU/MM3 (ref 0–0.4)
ERYTHROCYTE [DISTWIDTH] IN BLOOD BY AUTOMATED COUNT: 12.6 % (ref 11.5–14.5)
GFR SERPL CREATININE-BSD FRML MDRD: > 90 ML/MIN/1.73M2
GLUCOSE SERPL-MCNC: 90 MG/DL (ref 74–109)
GLUCOSE URINE, POC: NEGATIVE MG/DL
HCT VFR BLD AUTO: 39 % (ref 37–47)
HGB BLD-MCNC: 13.5 GM/DL (ref 12–16)
IMM GRANULOCYTES # BLD AUTO: 0.01 THOU/MM3 (ref 0–0.07)
IMM GRANULOCYTES NFR BLD AUTO: 0.2 %
KETONES, POC: NEGATIVE MG/DL
LEUKOCYTE EST, POC: NEGATIVE
LYMPHOCYTES ABSOLUTE: 1.8 THOU/MM3 (ref 1–4.8)
LYMPHOCYTES NFR BLD AUTO: 28.8 %
MCH RBC QN AUTO: 30.4 PG (ref 26–33)
MCHC RBC AUTO-ENTMCNC: 34.6 GM/DL (ref 32.2–35.5)
MCV RBC AUTO: 87.8 FL (ref 81–99)
MONOCYTES ABSOLUTE: 0.3 THOU/MM3 (ref 0.4–1.3)
MONOCYTES NFR BLD AUTO: 4.5 %
NEUTROPHILS ABSOLUTE: 4 THOU/MM3 (ref 1.8–7.7)
NEUTROPHILS NFR BLD AUTO: 62.9 %
NITRITE, POC: NEGATIVE
NRBC BLD AUTO-RTO: 0 /100 WBC
PH, POC: 7
PLATELET # BLD AUTO: 189 THOU/MM3 (ref 130–400)
PMV BLD AUTO: 11.2 FL (ref 9.4–12.4)
POTASSIUM SERPL-SCNC: 4.1 MEQ/L (ref 3.5–5.2)
PROT SERPL-MCNC: 6.9 G/DL (ref 6.4–8.3)
PROTEIN, POC: NORMAL MG/DL
RBC # BLD AUTO: 4.44 MILL/MM3 (ref 4.2–5.4)
SODIUM SERPL-SCNC: 140 MEQ/L (ref 135–145)
SPECIFIC GRAVITY, POC: 1.02
TSH SERPL DL<=0.05 MIU/L-ACNC: 0.93 UIU/ML (ref 0.27–4.2)
UROBILINOGEN, POC: NORMAL MG/DL
WBC # BLD AUTO: 6.4 THOU/MM3 (ref 4.8–10.8)

## 2025-04-10 PROCEDURE — 80053 COMPREHEN METABOLIC PANEL: CPT

## 2025-04-10 PROCEDURE — 81003 URINALYSIS AUTO W/O SCOPE: CPT | Performed by: NURSE PRACTITIONER

## 2025-04-10 PROCEDURE — 82306 VITAMIN D 25 HYDROXY: CPT

## 2025-04-10 PROCEDURE — 85025 COMPLETE CBC W/AUTO DIFF WBC: CPT

## 2025-04-10 PROCEDURE — 36415 COLL VENOUS BLD VENIPUNCTURE: CPT

## 2025-04-10 PROCEDURE — 84443 ASSAY THYROID STIM HORMONE: CPT

## 2025-04-10 PROCEDURE — 99214 OFFICE O/P EST MOD 30 MIN: CPT | Performed by: NURSE PRACTITIONER

## 2025-04-10 NOTE — PROGRESS NOTES
Rfl:     Allergies:  has no known allergies.    Past Medical History   has a past medical history of Anxiety, Depression, History of blood transfusion, and PONV (postoperative nausea and vomiting).    Subjective:      Review of Systems  As noted in HPI    Objective:     /82 (BP Site: Left Upper Arm, Patient Position: Sitting, BP Cuff Size: Medium Adult)   Pulse (!) 101   Resp 18   Ht 1.702 m (5' 7\")   Wt 70.3 kg (155 lb)   SpO2 98%   BMI 24.28 kg/m²     Physical Exam  Constitutional:       Appearance: Normal appearance. She is normal weight.   HENT:      Head: Normocephalic.   Cardiovascular:      Rate and Rhythm: Normal rate and regular rhythm.      Heart sounds: Normal heart sounds.   Pulmonary:      Effort: Pulmonary effort is normal.      Breath sounds: Normal breath sounds.   Abdominal:      General: Abdomen is flat. Bowel sounds are normal.      Palpations: Abdomen is soft. There is no mass.      Tenderness: There is abdominal tenderness (diffuse mild). There is no right CVA tenderness, left CVA tenderness or guarding.   Musculoskeletal:         General: Normal range of motion.   Skin:     General: Skin is warm and dry.      Findings: No rash.   Neurological:      General: No focal deficit present.      Mental Status: She is alert and oriented to person, place, and time.   Psychiatric:         Mood and Affect: Mood normal.         Behavior: Behavior normal.         Thought Content: Thought content normal.         Judgment: Judgment normal.         Assessment/Plan:     Assessment & Plan  1. Wellness exam.  - Regular physical exam scheduled for 08/2025.  - Blood work ordered to check kidney function, liver function, blood counts, thyroid function, and vitamin D levels.  - Discussed the possibility of perimenopause and its symptoms.  - Advised to consult OB-GYN regarding hormone therapy for sleep issues and decreased libido.    2. Urinary symptoms.  - Reports urinary symptoms including strong smell

## 2025-04-11 ENCOUNTER — RESULTS FOLLOW-UP (OUTPATIENT)
Dept: FAMILY MEDICINE CLINIC | Age: 40
End: 2025-04-11

## 2025-04-11 LAB
BACTERIA UR CULT: ABNORMAL
ORGANISM: ABNORMAL

## 2025-04-14 ENCOUNTER — RESULTS FOLLOW-UP (OUTPATIENT)
Dept: FAMILY MEDICINE CLINIC | Age: 40
End: 2025-04-14

## 2025-04-14 NOTE — TELEPHONE ENCOUNTER
Spoke to patient regarding her urine results. Patient says that her symptoms are gone with no other issues.

## 2025-04-14 NOTE — TELEPHONE ENCOUNTER
Spoke to patient and lab results were given. Patient voices understanding with no questions/concerns.

## 2025-08-22 ENCOUNTER — HOSPITAL ENCOUNTER (OUTPATIENT)
Dept: GENERAL RADIOLOGY | Age: 40
Discharge: HOME | End: 2025-08-22
Payer: COMMERCIAL

## 2025-08-22 ENCOUNTER — HOSPITAL ENCOUNTER (OUTPATIENT)
Age: 40
Discharge: HOME | End: 2025-08-22
Payer: COMMERCIAL

## 2025-08-22 ENCOUNTER — APPOINTMENT (OUTPATIENT)
Dept: GENERAL RADIOLOGY | Age: 40
End: 2025-08-22
Payer: COMMERCIAL

## 2025-08-22 ENCOUNTER — OFFICE VISIT (OUTPATIENT)
Dept: FAMILY MEDICINE CLINIC | Age: 40
End: 2025-08-22
Payer: COMMERCIAL

## 2025-08-22 VITALS
TEMPERATURE: 98.5 F | RESPIRATION RATE: 16 BRPM | OXYGEN SATURATION: 98 % | DIASTOLIC BLOOD PRESSURE: 70 MMHG | HEART RATE: 78 BPM | WEIGHT: 154.2 LBS | HEIGHT: 66 IN | BODY MASS INDEX: 24.78 KG/M2 | SYSTOLIC BLOOD PRESSURE: 124 MMHG

## 2025-08-22 DIAGNOSIS — M54.6 ACUTE MIDLINE THORACIC BACK PAIN: ICD-10-CM

## 2025-08-22 DIAGNOSIS — R07.9 CHEST PAIN, UNSPECIFIED TYPE: ICD-10-CM

## 2025-08-22 DIAGNOSIS — R07.9 CHEST PAIN, UNSPECIFIED TYPE: Primary | ICD-10-CM

## 2025-08-22 PROCEDURE — 71046 X-RAY EXAM CHEST 2 VIEWS: CPT

## 2025-08-22 PROCEDURE — 72070 X-RAY EXAM THORAC SPINE 2VWS: CPT

## 2025-08-22 PROCEDURE — 93000 ELECTROCARDIOGRAM COMPLETE: CPT | Performed by: NURSE PRACTITIONER

## 2025-08-22 PROCEDURE — 99214 OFFICE O/P EST MOD 30 MIN: CPT | Performed by: NURSE PRACTITIONER

## 2025-08-22 RX ORDER — CYCLOBENZAPRINE HCL 10 MG
10 TABLET ORAL 3 TIMES DAILY PRN
Qty: 21 TABLET | Refills: 0 | Status: SHIPPED | OUTPATIENT
Start: 2025-08-22 | End: 2025-09-01

## 2025-08-22 RX ORDER — PREDNISONE 50 MG/1
50 TABLET ORAL DAILY
Qty: 5 TABLET | Refills: 0 | Status: SHIPPED | OUTPATIENT
Start: 2025-08-22 | End: 2025-08-27

## (undated) DEVICE — PAD,SANITARY,11 IN,MAXI,W/WINGS,N-STRL: Brand: MEDLINE

## (undated) DEVICE — 40586 ADVANCED TRENDELENBURG POSITIONING KIT: Brand: 40586 ADVANCED TRENDELENBURG POSITIONING KIT

## (undated) DEVICE — GLOVE ORANGE PI 7   MSG9070

## (undated) DEVICE — SEAL

## (undated) DEVICE — VCARE LARGE, UTERINE MANIPULATOR, VAGINAL-CERVICAL-AHLUWALIA'S-RETRACTOR-ELEVATOR: Brand: VCARE

## (undated) DEVICE — GLOVE SURG SZ 65 THK91MIL LTX FREE SYN POLYISOPRENE

## (undated) DEVICE — PAD,NON-ADHERENT,3X8,STERILE,LF,1/PK: Brand: MEDLINE

## (undated) DEVICE — CYSTO/BLADDER IRRIGATION SET, REGULATING CLAMP

## (undated) DEVICE — RED RUBBER ROBINSON URETHRAL CATHETER, RADIOPAQUE, SMOOTH ROUNDED TIP, 14 FR (4.7 MM): Brand: DOVER

## (undated) DEVICE — SURE SET SINGLE BASIN-LF: Brand: MEDLINE INDUSTRIES, INC.

## (undated) DEVICE — DRAPE,UNDERBUTTOCKS,PCH,STERILE: Brand: MEDLINE

## (undated) DEVICE — TIP COVER ACCESSORY

## (undated) DEVICE — TOWEL,OR,DSP,ST,BLUE,STD,4/PK,20PK/CS: Brand: MEDLINE

## (undated) DEVICE — TUBING, SUCTION, 1/4" X 12', STRAIGHT: Brand: MEDLINE

## (undated) DEVICE — ARM DRAPE

## (undated) DEVICE — SOLUTION IV 1000ML 0.9% SOD CHL PH 5 INJ USP VIAFLX PLAS

## (undated) DEVICE — HANDPIECE (PACKAGE IS THE ONLY VALID ORDER UOM) ABLAT DISP FOR ENDOMET SYS

## (undated) DEVICE — BLADELESS OBTURATOR: Brand: WECK VISTA

## (undated) DEVICE — BANDAGE ADH W1XL3IN NAT FAB WVN FLX DURABLE N ADH PD SEAL

## (undated) DEVICE — TRI-LUMEN FILTERED TUBE SET WITH ACTIVATED CHARCOAL FILTER: Brand: AIRSEAL

## (undated) DEVICE — GAUZE,SPONGE,8"X4",12PLY,XRAY,STRL,LF: Brand: MEDLINE

## (undated) DEVICE — PACK,SET UP,NO DRAPES: Brand: MEDLINE

## (undated) DEVICE — KENDALL SCD EXPRESS SLEEVES, KNEE LENGTH, MEDIUM: Brand: KENDALL SCD

## (undated) DEVICE — POSITIONER HD W8XH4XL8.5IN RASPBERRY FOAM SLT

## (undated) DEVICE — GOWN,SIRUS,NON REINFRCD,LARGE,SET IN SL: Brand: MEDLINE

## (undated) DEVICE — Z DISCONTINUED BY MEDLINE USE 2711682 TRAY SKIN PREP DRY W/ PREM GLV

## (undated) DEVICE — PACK PROCEDURE SURG GYN ROBOTIC

## (undated) DEVICE — AIRSEAL 8 MM ACCESS PORT AND LOW PROFILE OBTURATOR WITH BLADELESS OPTICAL TIP, 120 MM LENGTH: Brand: AIRSEAL

## (undated) DEVICE — ELECTRODE PT RET AD L9FT HI MOIST COND ADH HYDRGEL CORDED

## (undated) DEVICE — COLUMN DRAPE

## (undated) DEVICE — SUTURE VICRYL + SZ 0 L27IN ABSRB VLT L36MM CT-1 1/2 CIR VCPB260H

## (undated) DEVICE — SUTURE VICRYL + SZ 0 L27IN ABSRB VLT L26MM CT-2 1/2 CIR VCP334H

## (undated) DEVICE — Z DISCONTINUED USE 2273272 SOLUTION SURG PREP SCRUB POV IOD 7.5% 4 OZ